# Patient Record
Sex: MALE | Race: WHITE | NOT HISPANIC OR LATINO | Employment: UNEMPLOYED | URBAN - METROPOLITAN AREA
[De-identification: names, ages, dates, MRNs, and addresses within clinical notes are randomized per-mention and may not be internally consistent; named-entity substitution may affect disease eponyms.]

---

## 2017-04-21 ENCOUNTER — HISTORICAL (OUTPATIENT)
Dept: RADIOLOGY | Facility: HOSPITAL | Age: 77
End: 2017-04-21

## 2017-05-05 ENCOUNTER — HISTORICAL (OUTPATIENT)
Dept: RADIOLOGY | Facility: HOSPITAL | Age: 77
End: 2017-05-05

## 2017-05-08 ENCOUNTER — HISTORICAL (OUTPATIENT)
Dept: RADIOLOGY | Facility: HOSPITAL | Age: 77
End: 2017-05-08

## 2017-07-18 ENCOUNTER — HISTORICAL (OUTPATIENT)
Dept: RADIOLOGY | Facility: HOSPITAL | Age: 77
End: 2017-07-18

## 2017-11-09 ENCOUNTER — HISTORICAL (OUTPATIENT)
Dept: RADIOLOGY | Facility: HOSPITAL | Age: 77
End: 2017-11-09

## 2018-01-25 ENCOUNTER — HISTORICAL (OUTPATIENT)
Dept: SURGERY | Facility: HOSPITAL | Age: 78
End: 2018-01-25

## 2018-01-25 LAB
ABS NEUT (OLG): 3 X10(3)/MCL (ref 1.5–6.9)
ALBUMIN SERPL-MCNC: 4.4 GM/DL (ref 3.4–5)
ALBUMIN/GLOB SERPL: 1.1 RATIO
ALP SERPL-CCNC: 74 UNIT/L (ref 30–113)
ALT SERPL-CCNC: 19 UNIT/L (ref 10–45)
APTT PPP: 24 SECOND(S) (ref 25–35)
AST SERPL-CCNC: 22 UNIT/L (ref 15–37)
BILIRUB SERPL-MCNC: 0.6 MG/DL (ref 0.1–0.9)
BILIRUBIN DIRECT+TOT PNL SERPL-MCNC: 0.1 MG/DL (ref 0–0.3)
BILIRUBIN DIRECT+TOT PNL SERPL-MCNC: 0.5 MG/DL
BUN SERPL-MCNC: 17 MG/DL (ref 10–20)
CALCIUM SERPL-MCNC: 9.3 MG/DL (ref 8–10.5)
CHLORIDE SERPL-SCNC: 101 MMOL/L (ref 100–108)
CO2 SERPL-SCNC: 29 MMOL/L (ref 21–35)
CREAT SERPL-MCNC: 1.21 MG/DL (ref 0.7–1.3)
ERYTHROCYTE [DISTWIDTH] IN BLOOD BY AUTOMATED COUNT: 13 % (ref 11.5–17)
GLOBULIN SER-MCNC: 3.9 GM/DL
GLUCOSE SERPL-MCNC: 112 MG/DL (ref 75–116)
HCT VFR BLD AUTO: 45.3 % (ref 42–52)
HGB BLD-MCNC: 15.2 GM/DL (ref 14–18)
INR PPP: 0.9 (ref 0–1.2)
MCH RBC QN AUTO: 32 PG (ref 27–34)
MCHC RBC AUTO-ENTMCNC: 34 GM/DL (ref 31–36)
MCV RBC AUTO: 97 FL (ref 80–99)
PLATELET # BLD AUTO: 210 X10(3)/MCL (ref 140–400)
PMV BLD AUTO: 10 FL (ref 6.8–10)
POTASSIUM SERPL-SCNC: 4.6 MMOL/L (ref 3.6–5.2)
PROT SERPL-MCNC: 8.3 GM/DL (ref 6.4–8.2)
PROTHROMBIN TIME: 10 SECOND(S) (ref 9–12)
RBC # BLD AUTO: 4.67 X10(6)/MCL (ref 4.7–6.1)
SODIUM SERPL-SCNC: 137 MMOL/L (ref 135–145)
WBC # SPEC AUTO: 5.2 X10(3)/MCL (ref 4.5–11.5)

## 2018-02-01 ENCOUNTER — HISTORICAL (OUTPATIENT)
Dept: ANESTHESIOLOGY | Facility: HOSPITAL | Age: 78
End: 2018-02-01

## 2018-03-13 ENCOUNTER — HISTORICAL (OUTPATIENT)
Dept: SURGERY | Facility: HOSPITAL | Age: 78
End: 2018-03-13

## 2018-03-13 LAB
APPEARANCE, UA: CLEAR
APTT PPP: 24.5 SECOND(S) (ref 25–35)
BACTERIA SPEC CULT: NORMAL /HPF
BILIRUB UR QL STRIP: NEGATIVE
COLOR UR: YELLOW
GLUCOSE (UA): NEGATIVE
HGB UR QL STRIP: NEGATIVE
INR PPP: 0.9 (ref 0–1.2)
KETONES UR QL STRIP: NEGATIVE
LEUKOCYTE ESTERASE UR QL STRIP: NEGATIVE
NITRITE UR QL STRIP: NEGATIVE
PH UR STRIP: 6 [PH]
PROT UR QL STRIP: 100 MG/DL
PROTHROMBIN TIME: 10 SECOND(S) (ref 9–12)
RBC #/AREA URNS HPF: NORMAL /HPF
SP GR UR STRIP: 1.02
SQUAMOUS EPITHELIAL, UA: NORMAL
UROBILINOGEN UR STRIP-ACNC: 0.2 EU/DL
WBC #/AREA URNS HPF: NORMAL /[HPF]

## 2018-03-20 ENCOUNTER — HISTORICAL (OUTPATIENT)
Dept: ANESTHESIOLOGY | Facility: HOSPITAL | Age: 78
End: 2018-03-20

## 2018-04-24 ENCOUNTER — HISTORICAL (OUTPATIENT)
Dept: RADIOLOGY | Facility: HOSPITAL | Age: 78
End: 2018-04-24

## 2018-04-24 LAB — POC CREATININE: 1.2 MG/DL (ref 0.6–1.3)

## 2018-08-29 ENCOUNTER — HISTORICAL (OUTPATIENT)
Dept: RADIOLOGY | Facility: HOSPITAL | Age: 78
End: 2018-08-29

## 2018-09-11 ENCOUNTER — HISTORICAL (OUTPATIENT)
Dept: RADIOLOGY | Facility: HOSPITAL | Age: 78
End: 2018-09-11

## 2020-07-10 ENCOUNTER — HISTORICAL (OUTPATIENT)
Dept: RADIOLOGY | Facility: HOSPITAL | Age: 80
End: 2020-07-10

## 2021-05-19 ENCOUNTER — HISTORICAL (OUTPATIENT)
Dept: ADMINISTRATIVE | Facility: HOSPITAL | Age: 81
End: 2021-05-19

## 2021-05-19 LAB — PSA SERPL-MCNC: 5.17 NG/ML

## 2022-04-04 ENCOUNTER — HISTORICAL (OUTPATIENT)
Dept: LAB | Facility: HOSPITAL | Age: 82
End: 2022-04-04

## 2022-04-04 LAB
ABS NEUT (OLG): 2.8 (ref 1.5–6.9)
ALBUMIN SERPL-MCNC: 4 G/DL (ref 3.4–4.8)
ALBUMIN/GLOB SERPL: 1.4 {RATIO} (ref 1.1–2)
ALP SERPL-CCNC: 62 U/L (ref 40–150)
ALT SERPL-CCNC: 8 U/L (ref 0–55)
AST SERPL-CCNC: 19 U/L (ref 5–34)
BASOPHILS # BLD AUTO: 0.1 10*3/UL (ref 0–0.1)
BASOPHILS NFR BLD AUTO: 1 % (ref 0–1)
BILIRUB SERPL-MCNC: 0.6 MG/DL
BILIRUBIN DIRECT+TOT PNL SERPL-MCNC: 0.2 (ref 0–0.5)
BILIRUBIN DIRECT+TOT PNL SERPL-MCNC: 0.4 (ref 0–0.8)
BUN SERPL-MCNC: 21 MG/DL (ref 8.4–25.7)
CALCIUM SERPL-MCNC: 9.8 MG/DL (ref 8.7–10.5)
CHLORIDE SERPL-SCNC: 107 MMOL/L (ref 98–107)
CHOLEST SERPL-MCNC: 257 MG/DL
CHOLEST/HDLC SERPL: 5 {RATIO} (ref 0–5)
CO2 SERPL-SCNC: 26 MMOL/L (ref 23–31)
CREAT SERPL-MCNC: 1.07 MG/DL (ref 0.73–1.18)
EOSINOPHIL # BLD AUTO: 0.3 10*3/UL (ref 0–0.6)
EOSINOPHIL NFR BLD AUTO: 5 % (ref 0–5)
ERYTHROCYTE [DISTWIDTH] IN BLOOD BY AUTOMATED COUNT: 13.2 % (ref 11.5–17)
GLOBULIN SER-MCNC: 2.9 G/DL (ref 2.4–3.5)
GLUCOSE SERPL-MCNC: 107 MG/DL (ref 82–115)
HCT VFR BLD AUTO: 42.4 % (ref 42–52)
HDLC SERPL-MCNC: 51 MG/DL (ref 35–60)
HEMOLYSIS INTERF INDEX SERPL-ACNC: 2
HGB BLD-MCNC: 13.8 G/DL (ref 14–18)
ICTERIC INTERF INDEX SERPL-ACNC: 0
IMM GRANULOCYTES # BLD AUTO: 0.01 10*3/UL (ref 0–0.02)
IMM GRANULOCYTES NFR BLD AUTO: 0.2 % (ref 0–0.43)
LDLC SERPL CALC-MCNC: 167 MG/DL (ref 50–140)
LIPEMIC INTERF INDEX SERPL-ACNC: 1
LYMPHOCYTES # BLD AUTO: 1.5 10*3/UL (ref 0.5–4.1)
LYMPHOCYTES NFR BLD AUTO: 29 % (ref 15–40)
MANUAL DIFF? (OHS): NO
MCH RBC QN AUTO: 32 PG (ref 27–34)
MCHC RBC AUTO-ENTMCNC: 32 G/DL (ref 31–36)
MCV RBC AUTO: 100 FL (ref 80–99)
MONOCYTES # BLD AUTO: 0.6 10*3/UL (ref 0–1.1)
MONOCYTES NFR BLD AUTO: 11 % (ref 4–12)
NEUTROPHILS # BLD AUTO: 2.8 10*3/UL (ref 1.5–6.9)
NEUTROPHILS NFR BLD AUTO: 54 % (ref 43–75)
PLATELET # BLD AUTO: 256 10*3/UL (ref 140–400)
PMV BLD AUTO: 9.8 FL (ref 6.8–10)
POTASSIUM SERPL-SCNC: 4.7 MMOL/L (ref 3.5–5.1)
PROT SERPL-MCNC: 6.9 G/DL (ref 5.8–7.6)
RBC # BLD AUTO: 4.25 10*6/UL (ref 4.7–6.1)
SODIUM SERPL-SCNC: 142 MMOL/L (ref 136–145)
TRIGL SERPL-MCNC: 195 MG/DL (ref 34–140)
VLDLC SERPL CALC-MCNC: 39 MG/DL
WBC # SPEC AUTO: 5.2 10*3/UL (ref 4.5–11.5)

## 2022-04-10 ENCOUNTER — HISTORICAL (OUTPATIENT)
Dept: ADMINISTRATIVE | Facility: HOSPITAL | Age: 82
End: 2022-04-10

## 2022-04-24 VITALS
OXYGEN SATURATION: 96 % | SYSTOLIC BLOOD PRESSURE: 135 MMHG | DIASTOLIC BLOOD PRESSURE: 76 MMHG | HEIGHT: 71 IN | WEIGHT: 201.06 LBS | BODY MASS INDEX: 28.15 KG/M2

## 2022-04-30 NOTE — OP NOTE
Patient:   Checo Webb             MRN: [id number]            FIN: [id number]               Age:   77 years     Sex:  Male     :  1940   Associated Diagnoses:   Ventral hernia   Author:   Natalee Santos MD      Operative Note   Operative Information   Date/ Time:  3/20/2018 13:10:00.     Procedures Performed: Procedure Code   Laparoscopy, surgical, repair, incisional hernia (includes mesh insertion, when performed); reducible (58487)..     Indications: 78-year-old white male developing incisional ventral hernia without incarceration however symptomatic with activity..     Preoperative Diagnosis: Ventral hernia ([id number]).     Postoperative Diagnosis: Ventral hernia ([id number] K43).     Surgeon: Natalee Santos MD.     Anesthesia: Gen..     Speciman Removed: preperitoneal fat and hernia sac.     Description of Procedure/Findings/    Complications: Procedure in detail:  Patient brought to the operating theater laid in the supine position general endotracheal intubation was performed a left bump was placed.  There the abdomen and chest were sterilely prepped and draped in normal surgical fashion.  Using Veress needle technique Veress needle was inserted palmers point in the left upper quadrant of the first pass and the abdomen was insufflated to 15 mmHg with CO2 gas.  The point a Visiport was placed into the right hypogastrium without difficulty.  The left abdominal wall was then examined and appropriate insertion point for robotic trochars were chosen.  The skin and subcutaneous tissues overlying each insertion point were infiltrated 1% lidocaine and epinephrine.  A #11 blade was used to incise the skin in the trochars were inserted under direct visualization into the left quadrant.  Two 8 mm operative trochars and one 8 mm optical trocar. then finally a right upper quadrant assistant port was placed.   The robot was then pulled over the patient's right side and the camera and instruments were  introduced into the abdomen under direct visualization.  At this point I took over control of the robotic tower leaving the assistance at the bedside.  Properly marked spots within the peritoneum were chosen and dissection was carried around the ventral wall defect. This proved to be of large size ventral wall defect along with a Swiss cheese fascial breakdown.  the large defect and multiple small hernias throughout the abdominal wall could not be appropriately reapproximated as anticipated.   The defect measured approximately 5 x 6 cm in size with multiple smaller hernia defects around the midline.  A large peritoneal flap was created about encompassing the entire defect.   A Bard composite mesh was then chosen with 11 cm diameter and sutured onto the abdominal wall covering the ventral wall defect and all smaller defects with appropriate overlay.   A large preperitoneal fat pad and hernia sac was then removed from the right quadrant incision The  trochars then removed under direct visualization and the abdomen was relieved of insufflation.  The trocar insertion sites were closed with  4-0 subcuticular Monocryl and a sterile dressing was placed upon the skin.  The patient was relieved anesthesia in a stable condition and transferred to the postanesthesia care unit.  .     Esimated blood loss: loss  5  cc.     Complications: None.

## 2022-04-30 NOTE — OP NOTE
PREOPERATIVE DIAGNOSIS:    1. History of colon polyps.  2. Status post right-sided hemicolectomy secondary to biopsy secondary to a large tubular adenoma, unresectable endoscopically.    POSTOPERATIVE DIAGNOSIS:    1. No signs of right-sided recurrence of polyps.  2. Pan diverticulosis.  3. Proximal rectal polyp, cold biopsy removal piecemeal.  4. Distal rectal polyp, cold biopsy removal piecemeal.    INDICATIONS:  A 77-year-old white male, history of large elongated 3-4 cm adenoma not amenable to endoscopic resection in November 2016.  Patient had subsequent right-sided hemicolectomy under Dr. Natalee Santos.  The patient is here today to follow up for repeat colonoscopy surveillance approximately 1 year postop.     He denies any other alarm symptoms.     He was consented for the procedure in my office.  The risks and benefits of the procedure were explained to him in detail.  He is willing to undergo those risks.    PROCEDURE IN DETAIL:  He was brought down to the endoscopy suite, laid in the left lateral decubitus position.  Rectal exam was performed.  No internal and no external abnormalities.  Prostate was mildly enlarged, otherwise normal.  No nodules palpated.     The scope was then lubricated and advanced all way to the right side of the colon and where the anastomotic site was seen.     I was able to clearly enter into the terminal ileum where there were no changes, masses, polyps or any mucosal changes.  Same applies to anastomotic site, which was viewed, photographed and washed and the proximal side of the transverse colon at the anastomotic site was also free of lesions.   Of note, just after this area, he started to have signs of diverticulosis throughout.  Primarily left-sided and more in the sigmoid but he had pandiverticulosis throughout the remaining part of his colon.  These all ranged from about 4-5 mm.  No masses in the transverse, splenic and descending colon and sigmoid.     However,  there was a small 4-5 mm polyp at the proximal rectal region that was removed cold biopsy piecemeal removal completely.  Bleeding complications none.  No perforation.  The same maneuver was performed with a rectal polyp distally, a 4 to 5 mm lesion, cold biopsy removal piecemeal.  Blood loss was negligible.     The scope was withdrawn.  Patient tolerated the procedure well.    SUMMARY:    1. A 77-year-old white male with no signs of recurrence of previous polyp around the anastomotic site.  2. Pandiverticulosis.  3. Proximal rectal polyp cold biopsy removal piecemeal.  4. Rectal polyp biopsy cold removal piecemeal.    PLAN:  We will follow up on these pathology of the biopsies aforementioned.  If these come back as hyperplastic, we will likely not recommend any further colonoscopies due to age.  Just watch him from the standpoint of diverticular disease but otherwise, again, we will wait on pathology to make specific recommendations.  Three years versus again secondary to age, then just follow him clinically.        LATIA   DD: 02/01/2018 1049   DT: 02/01/2018 1117  Job # [id number]    cc: Natalee Santos M.D.

## 2023-02-24 ENCOUNTER — LAB VISIT (OUTPATIENT)
Dept: LAB | Facility: HOSPITAL | Age: 83
End: 2023-02-24
Attending: INTERNAL MEDICINE
Payer: MEDICARE

## 2023-02-24 DIAGNOSIS — R42 DIZZINESS AND GIDDINESS: Primary | ICD-10-CM

## 2023-02-24 DIAGNOSIS — R82.998 OTHER ABNORMAL FINDINGS IN URINE: ICD-10-CM

## 2023-02-24 DIAGNOSIS — E55.9 VITAMIN D DEFICIENCY: ICD-10-CM

## 2023-02-24 LAB
ALBUMIN SERPL-MCNC: 4.1 G/DL (ref 3.4–4.8)
ALBUMIN/GLOB SERPL: 1.4 RATIO (ref 1.1–2)
ALP SERPL-CCNC: 64 UNIT/L (ref 40–150)
ALT SERPL-CCNC: <5 UNIT/L (ref 0–55)
APPEARANCE UR: CLEAR
AST SERPL-CCNC: 16 UNIT/L (ref 5–34)
BACTERIA #/AREA URNS AUTO: ABNORMAL /HPF
BASOPHILS # BLD AUTO: 0.04 X10(3)/MCL (ref 0–0.2)
BASOPHILS NFR BLD AUTO: 0.8 %
BILIRUB UR QL STRIP.AUTO: NEGATIVE MG/DL
BILIRUBIN DIRECT+TOT PNL SERPL-MCNC: 0.4 MG/DL
BUN SERPL-MCNC: 23 MG/DL (ref 8.4–25.7)
CALCIUM SERPL-MCNC: 9.4 MG/DL (ref 8.8–10)
CHLORIDE SERPL-SCNC: 109 MMOL/L (ref 98–107)
CO2 SERPL-SCNC: 25 MMOL/L (ref 23–31)
COLOR UR AUTO: YELLOW
CREAT SERPL-MCNC: 1.61 MG/DL (ref 0.73–1.18)
EOSINOPHIL # BLD AUTO: 0.14 X10(3)/MCL (ref 0–0.9)
EOSINOPHIL NFR BLD AUTO: 2.7 %
ERYTHROCYTE [DISTWIDTH] IN BLOOD BY AUTOMATED COUNT: 13.5 % (ref 11.5–17)
GFR SERPLBLD CREATININE-BSD FMLA CKD-EPI: 42 MLS/MIN/1.73/M2
GLOBULIN SER-MCNC: 2.9 GM/DL (ref 2.4–3.5)
GLUCOSE SERPL-MCNC: 124 MG/DL (ref 82–115)
GLUCOSE UR QL STRIP.AUTO: NEGATIVE MG/DL
HCT VFR BLD AUTO: 40.2 % (ref 42–52)
HGB BLD-MCNC: 13.5 G/DL (ref 14–18)
IMM GRANULOCYTES # BLD AUTO: 0.01 X10(3)/MCL (ref 0–0.04)
IMM GRANULOCYTES NFR BLD AUTO: 0.2 %
KETONES UR QL STRIP.AUTO: NEGATIVE MG/DL
LEUKOCYTE ESTERASE UR QL STRIP.AUTO: NEGATIVE UNIT/L
LYMPHOCYTES # BLD AUTO: 1.23 X10(3)/MCL (ref 0.6–4.6)
LYMPHOCYTES NFR BLD AUTO: 23.7 %
MCH RBC QN AUTO: 32.6 PG
MCHC RBC AUTO-ENTMCNC: 33.6 G/DL (ref 33–36)
MCV RBC AUTO: 97.1 FL (ref 80–94)
MONOCYTES # BLD AUTO: 0.47 X10(3)/MCL (ref 0.1–1.3)
MONOCYTES NFR BLD AUTO: 9 %
MUCOUS THREADS URNS QL MICRO: ABNORMAL /LPF
NEUTROPHILS # BLD AUTO: 3.31 X10(3)/MCL (ref 2.1–9.2)
NEUTROPHILS NFR BLD AUTO: 63.6 %
NITRITE UR QL STRIP.AUTO: NEGATIVE
PH UR STRIP.AUTO: 5 [PH]
PLATELET # BLD AUTO: 201 X10(3)/MCL (ref 130–400)
PMV BLD AUTO: 9.9 FL (ref 7.4–10.4)
POTASSIUM SERPL-SCNC: 4.8 MMOL/L (ref 3.5–5.1)
PROT SERPL-MCNC: 7 GM/DL (ref 5.8–7.6)
PROT UR QL STRIP.AUTO: >=300 MG/DL
RBC # BLD AUTO: 4.14 X10(6)/MCL (ref 4.7–6.1)
RBC #/AREA URNS AUTO: ABNORMAL /HPF
RBC UR QL AUTO: NEGATIVE UNIT/L
SODIUM SERPL-SCNC: 142 MMOL/L (ref 136–145)
SP GR UR STRIP.AUTO: >=1.03
SQUAMOUS #/AREA URNS AUTO: ABNORMAL /HPF
UROBILINOGEN UR STRIP-ACNC: 0.2 MG/DL
WBC # SPEC AUTO: 5.2 X10(3)/MCL (ref 4.5–11.5)
WBC #/AREA URNS AUTO: ABNORMAL /HPF

## 2023-02-24 PROCEDURE — 82607 VITAMIN B-12: CPT

## 2023-02-24 PROCEDURE — 36415 COLL VENOUS BLD VENIPUNCTURE: CPT

## 2023-02-24 PROCEDURE — 82306 VITAMIN D 25 HYDROXY: CPT

## 2023-02-24 PROCEDURE — 85025 COMPLETE CBC W/AUTO DIFF WBC: CPT

## 2023-02-24 PROCEDURE — 87088 URINE BACTERIA CULTURE: CPT

## 2023-02-24 PROCEDURE — 81001 URINALYSIS AUTO W/SCOPE: CPT

## 2023-02-24 PROCEDURE — 80053 COMPREHEN METABOLIC PANEL: CPT

## 2023-02-25 LAB
DEPRECATED CALCIDIOL+CALCIFEROL SERPL-MC: 72.8 NG/ML (ref 30–80)
VIT B12 SERPL-MCNC: 523 PG/ML (ref 213–816)

## 2023-02-27 LAB — BACTERIA UR CULT: NO GROWTH

## 2023-07-03 ENCOUNTER — LAB VISIT (OUTPATIENT)
Dept: LAB | Facility: HOSPITAL | Age: 83
End: 2023-07-03
Attending: INTERNAL MEDICINE
Payer: MEDICARE

## 2023-07-03 DIAGNOSIS — M94.9 DISORDER OF CARTILAGE, UNSPECIFIED: ICD-10-CM

## 2023-07-03 DIAGNOSIS — M62.81 MUSCLE WEAKNESS (GENERALIZED): Primary | ICD-10-CM

## 2023-07-03 LAB
ALBUMIN SERPL-MCNC: 3.8 G/DL (ref 3.4–4.8)
ALBUMIN/GLOB SERPL: 1.1 RATIO (ref 1.1–2)
ALP SERPL-CCNC: 74 UNIT/L (ref 40–150)
ALT SERPL-CCNC: 7 UNIT/L (ref 0–55)
AST SERPL-CCNC: 24 UNIT/L (ref 5–34)
BASOPHILS # BLD AUTO: 0.05 X10(3)/MCL
BASOPHILS NFR BLD AUTO: 1.1 %
BILIRUBIN DIRECT+TOT PNL SERPL-MCNC: 0.6 MG/DL
BUN SERPL-MCNC: 25 MG/DL (ref 8.4–25.7)
CALCIUM SERPL-MCNC: 9.9 MG/DL (ref 8.8–10)
CHLORIDE SERPL-SCNC: 107 MMOL/L (ref 98–107)
CK SERPL-CCNC: 63 U/L (ref 30–200)
CO2 SERPL-SCNC: 25 MMOL/L (ref 23–31)
CREAT SERPL-MCNC: 1.63 MG/DL (ref 0.73–1.18)
EOSINOPHIL # BLD AUTO: 0.33 X10(3)/MCL (ref 0–0.9)
EOSINOPHIL NFR BLD AUTO: 7 %
ERYTHROCYTE [DISTWIDTH] IN BLOOD BY AUTOMATED COUNT: 12.9 % (ref 11.5–17)
GFR SERPLBLD CREATININE-BSD FMLA CKD-EPI: 41 MLS/MIN/1.73/M2
GLOBULIN SER-MCNC: 3.4 GM/DL (ref 2.4–3.5)
GLUCOSE SERPL-MCNC: 99 MG/DL (ref 82–115)
HCT VFR BLD AUTO: 43.1 % (ref 42–52)
HGB BLD-MCNC: 14 G/DL (ref 14–18)
IMM GRANULOCYTES # BLD AUTO: 0.02 X10(3)/MCL (ref 0–0.04)
IMM GRANULOCYTES NFR BLD AUTO: 0.4 %
LYMPHOCYTES # BLD AUTO: 1.03 X10(3)/MCL (ref 0.6–4.6)
LYMPHOCYTES NFR BLD AUTO: 21.8 %
MAGNESIUM SERPL-MCNC: 2.3 MG/DL (ref 1.6–2.6)
MCH RBC QN AUTO: 31.1 PG (ref 27–31)
MCHC RBC AUTO-ENTMCNC: 32.5 G/DL (ref 33–36)
MCV RBC AUTO: 95.8 FL (ref 80–94)
MONOCYTES # BLD AUTO: 0.44 X10(3)/MCL (ref 0.1–1.3)
MONOCYTES NFR BLD AUTO: 9.3 %
NEUTROPHILS # BLD AUTO: 2.85 X10(3)/MCL (ref 2.1–9.2)
NEUTROPHILS NFR BLD AUTO: 60.4 %
PLATELET # BLD AUTO: 271 X10(3)/MCL (ref 130–400)
PMV BLD AUTO: 9.8 FL (ref 7.4–10.4)
POTASSIUM SERPL-SCNC: 4.3 MMOL/L (ref 3.5–5.1)
PREALB SERPL-MCNC: 34.5 MG/DL (ref 16–42)
PROT SERPL-MCNC: 7.2 GM/DL (ref 5.8–7.6)
RBC # BLD AUTO: 4.5 X10(6)/MCL (ref 4.7–6.1)
SODIUM SERPL-SCNC: 142 MMOL/L (ref 136–145)
TSH SERPL-ACNC: 1.04 UIU/ML (ref 0.35–4.94)
VIT B12 SERPL-MCNC: 482 PG/ML (ref 213–816)
WBC # SPEC AUTO: 4.72 X10(3)/MCL (ref 4.5–11.5)

## 2023-07-03 PROCEDURE — 84443 ASSAY THYROID STIM HORMONE: CPT

## 2023-07-03 PROCEDURE — 82550 ASSAY OF CK (CPK): CPT

## 2023-07-03 PROCEDURE — 36415 COLL VENOUS BLD VENIPUNCTURE: CPT

## 2023-07-03 PROCEDURE — 84134 ASSAY OF PREALBUMIN: CPT

## 2023-07-03 PROCEDURE — 85025 COMPLETE CBC W/AUTO DIFF WBC: CPT

## 2023-07-03 PROCEDURE — 80053 COMPREHEN METABOLIC PANEL: CPT

## 2023-07-03 PROCEDURE — 82652 VIT D 1 25-DIHYDROXY: CPT

## 2023-07-03 PROCEDURE — 82607 VITAMIN B-12: CPT

## 2023-07-03 PROCEDURE — 83735 ASSAY OF MAGNESIUM: CPT

## 2023-07-09 LAB — 1,25(OH)2D SERPL-MCNC: 34 PG/ML (ref 18–64)

## 2024-02-23 ENCOUNTER — LAB VISIT (OUTPATIENT)
Dept: LAB | Facility: HOSPITAL | Age: 84
End: 2024-02-23
Attending: INTERNAL MEDICINE
Payer: MEDICARE

## 2024-02-23 DIAGNOSIS — Z13.6 ENCOUNTER FOR SCREENING FOR CARDIOVASCULAR DISORDERS: ICD-10-CM

## 2024-02-23 DIAGNOSIS — R82.6 ABNORMAL URINE LEVELS OF SUBSTANCES CHIEFLY NONMEDICINAL AS TO SOURCE: ICD-10-CM

## 2024-02-23 DIAGNOSIS — F02.80 ALZHEIMER'S DISEASE: Primary | ICD-10-CM

## 2024-02-23 DIAGNOSIS — G30.9 ALZHEIMER'S DISEASE: Primary | ICD-10-CM

## 2024-02-23 DIAGNOSIS — R79.9 ABNORMAL FINDING OF BLOOD CHEMISTRY, UNSPECIFIED: ICD-10-CM

## 2024-02-23 DIAGNOSIS — R94.5 ABNORMAL RESULTS OF LIVER FUNCTION STUDIES: ICD-10-CM

## 2024-02-23 LAB
(HCYS)2 SERPL-MCNC: 17.6 UMOL/L (ref 5.1–15.4)
ALBUMIN SERPL-MCNC: 4.1 G/DL (ref 3.4–4.8)
ALBUMIN/GLOB SERPL: 1.1 RATIO (ref 1.1–2)
ALP SERPL-CCNC: 88 UNIT/L (ref 40–150)
ALT SERPL-CCNC: 6 UNIT/L (ref 0–55)
APPEARANCE UR: CLEAR
AST SERPL-CCNC: 14 UNIT/L (ref 5–34)
BACTERIA #/AREA URNS AUTO: ABNORMAL /HPF
BASOPHILS # BLD AUTO: 0.04 X10(3)/MCL
BASOPHILS NFR BLD AUTO: 0.8 %
BILIRUB SERPL-MCNC: 0.3 MG/DL
BILIRUB UR QL STRIP.AUTO: NEGATIVE
BUN SERPL-MCNC: 26 MG/DL (ref 8.4–25.7)
CALCIUM SERPL-MCNC: 10.4 MG/DL (ref 8.8–10)
CHLORIDE SERPL-SCNC: 112 MMOL/L (ref 98–107)
CO2 SERPL-SCNC: 24 MMOL/L (ref 23–31)
COLOR UR AUTO: YELLOW
CREAT SERPL-MCNC: 1.9 MG/DL (ref 0.73–1.18)
EOSINOPHIL # BLD AUTO: 0.24 X10(3)/MCL (ref 0–0.9)
EOSINOPHIL NFR BLD AUTO: 4.7 %
ERYTHROCYTE [DISTWIDTH] IN BLOOD BY AUTOMATED COUNT: 13.6 % (ref 11.5–17)
FERRITIN SERPL-MCNC: 144.3 NG/ML (ref 21.81–274.66)
FOLATE SERPL-MCNC: 8.1 NG/ML (ref 7–31.4)
GFR SERPLBLD CREATININE-BSD FMLA CKD-EPI: 34 MLS/MIN/1.73/M2
GLOBULIN SER-MCNC: 3.6 GM/DL (ref 2.4–3.5)
GLUCOSE SERPL-MCNC: 106 MG/DL (ref 82–115)
GLUCOSE UR QL STRIP.AUTO: NEGATIVE
HAV IGM SERPL QL IA: NONREACTIVE
HBV CORE IGM SERPL QL IA: NONREACTIVE
HBV SURFACE AG SERPL QL IA: NONREACTIVE
HCT VFR BLD AUTO: 42.2 % (ref 42–52)
HCV AB SERPL QL IA: NONREACTIVE
HGB BLD-MCNC: 14 G/DL (ref 14–18)
IMM GRANULOCYTES # BLD AUTO: 0.01 X10(3)/MCL (ref 0–0.04)
IMM GRANULOCYTES NFR BLD AUTO: 0.2 %
KETONES UR QL STRIP.AUTO: NEGATIVE
LEUKOCYTE ESTERASE UR QL STRIP.AUTO: NEGATIVE
LYMPHOCYTES # BLD AUTO: 0.78 X10(3)/MCL (ref 0.6–4.6)
LYMPHOCYTES NFR BLD AUTO: 15.3 %
MCH RBC QN AUTO: 32 PG (ref 27–31)
MCHC RBC AUTO-ENTMCNC: 33.2 G/DL (ref 33–36)
MCV RBC AUTO: 96.3 FL (ref 80–94)
MONOCYTES # BLD AUTO: 0.35 X10(3)/MCL (ref 0.1–1.3)
MONOCYTES NFR BLD AUTO: 6.9 %
MUCOUS THREADS URNS QL MICRO: ABNORMAL /LPF
NEUTROPHILS # BLD AUTO: 3.67 X10(3)/MCL (ref 2.1–9.2)
NEUTROPHILS NFR BLD AUTO: 72.1 %
NITRITE UR QL STRIP.AUTO: NEGATIVE
PH UR STRIP.AUTO: 5.5 [PH]
PLATELET # BLD AUTO: 259 X10(3)/MCL (ref 130–400)
PMV BLD AUTO: 10 FL (ref 7.4–10.4)
POTASSIUM SERPL-SCNC: 4 MMOL/L (ref 3.5–5.1)
PROT SERPL-MCNC: 7.7 GM/DL (ref 5.8–7.6)
PROT UR QL STRIP.AUTO: ABNORMAL
RBC # BLD AUTO: 4.38 X10(6)/MCL (ref 4.7–6.1)
RBC #/AREA URNS AUTO: ABNORMAL /HPF
RBC UR QL AUTO: ABNORMAL
SODIUM SERPL-SCNC: 145 MMOL/L (ref 136–145)
SP GR UR STRIP.AUTO: 1.02 (ref 1–1.03)
SQUAMOUS #/AREA URNS AUTO: ABNORMAL /HPF
T PALLIDUM AB SER QL: NONREACTIVE
TSH SERPL-ACNC: 1.38 UIU/ML (ref 0.35–4.94)
UROBILINOGEN UR STRIP-ACNC: 0.2
VIT B12 SERPL-MCNC: 421 PG/ML (ref 213–816)
WBC # SPEC AUTO: 5.09 X10(3)/MCL (ref 4.5–11.5)
WBC #/AREA URNS AUTO: ABNORMAL /HPF

## 2024-02-23 PROCEDURE — 84443 ASSAY THYROID STIM HORMONE: CPT

## 2024-02-23 PROCEDURE — 83090 ASSAY OF HOMOCYSTEINE: CPT

## 2024-02-23 PROCEDURE — 87086 URINE CULTURE/COLONY COUNT: CPT

## 2024-02-23 PROCEDURE — 81001 URINALYSIS AUTO W/SCOPE: CPT

## 2024-02-23 PROCEDURE — 82390 ASSAY OF CERULOPLASMIN: CPT

## 2024-02-23 PROCEDURE — 82570 ASSAY OF URINE CREATININE: CPT

## 2024-02-23 PROCEDURE — 80053 COMPREHEN METABOLIC PANEL: CPT

## 2024-02-23 PROCEDURE — 82746 ASSAY OF FOLIC ACID SERUM: CPT

## 2024-02-23 PROCEDURE — 82525 ASSAY OF COPPER: CPT

## 2024-02-23 PROCEDURE — 86617 LYME DISEASE ANTIBODY: CPT

## 2024-02-23 PROCEDURE — 85025 COMPLETE CBC W/AUTO DIFF WBC: CPT

## 2024-02-23 PROCEDURE — 83825 ASSAY OF MERCURY: CPT

## 2024-02-23 PROCEDURE — 82835: CPT

## 2024-02-23 PROCEDURE — 36415 COLL VENOUS BLD VENIPUNCTURE: CPT

## 2024-02-23 PROCEDURE — 84425 ASSAY OF VITAMIN B-1: CPT

## 2024-02-23 PROCEDURE — 82728 ASSAY OF FERRITIN: CPT

## 2024-02-23 PROCEDURE — 80074 ACUTE HEPATITIS PANEL: CPT

## 2024-02-23 PROCEDURE — 82175 ASSAY OF ARSENIC: CPT

## 2024-02-23 PROCEDURE — 82607 VITAMIN B-12: CPT

## 2024-02-23 PROCEDURE — 82300 ASSAY OF CADMIUM: CPT

## 2024-02-23 PROCEDURE — 83655 ASSAY OF LEAD: CPT

## 2024-02-23 PROCEDURE — 86780 TREPONEMA PALLIDUM: CPT

## 2024-02-24 LAB
COPPER SERPL-MCNC: 115 MCG/DL (ref 73–129)
MAYO GENERIC ORDERABLE RESULT: NORMAL

## 2024-02-25 LAB
B BURGDOR AB SER QL IA: NEGATIVE
BACTERIA UR CULT: NO GROWTH
CERULOPLASMIN SERPL-MCNC: 30 MG/DL (ref 19–31)

## 2024-02-27 LAB
PATH REV: NORMAL
VIT B1 BLD-SCNC: 152 NMOL/L (ref 70–180)

## 2024-04-26 ENCOUNTER — LAB VISIT (OUTPATIENT)
Dept: LAB | Facility: HOSPITAL | Age: 84
End: 2024-04-26
Attending: INTERNAL MEDICINE
Payer: MEDICARE

## 2024-04-26 DIAGNOSIS — I11.9 MALIGNANT HYPERTENSIVE HEART DISEASE WITHOUT HEART FAILURE: Primary | ICD-10-CM

## 2024-04-26 DIAGNOSIS — E78.2 MIXED HYPERLIPIDEMIA: ICD-10-CM

## 2024-04-26 DIAGNOSIS — Z12.5 SPECIAL SCREENING FOR MALIGNANT NEOPLASM OF PROSTATE: ICD-10-CM

## 2024-04-26 LAB
ALBUMIN SERPL-MCNC: 3.9 G/DL (ref 3.4–4.8)
ALBUMIN/GLOB SERPL: 1.3 RATIO (ref 1.1–2)
ALP SERPL-CCNC: 70 UNIT/L (ref 40–150)
ALT SERPL-CCNC: 9 UNIT/L (ref 0–55)
AST SERPL-CCNC: 27 UNIT/L (ref 5–34)
BASOPHILS # BLD AUTO: 0.06 X10(3)/MCL
BASOPHILS NFR BLD AUTO: 0.9 %
BILIRUB SERPL-MCNC: 0.7 MG/DL
BUN SERPL-MCNC: 45 MG/DL (ref 8.4–25.7)
CALCIUM SERPL-MCNC: 9.3 MG/DL (ref 8.8–10)
CHLORIDE SERPL-SCNC: 110 MMOL/L (ref 98–107)
CHOLEST SERPL-MCNC: 231 MG/DL
CHOLEST/HDLC SERPL: 5 {RATIO} (ref 0–5)
CO2 SERPL-SCNC: 20 MMOL/L (ref 23–31)
CREAT SERPL-MCNC: 2.3 MG/DL (ref 0.73–1.18)
EOSINOPHIL # BLD AUTO: 0.27 X10(3)/MCL (ref 0–0.9)
EOSINOPHIL NFR BLD AUTO: 4.1 %
ERYTHROCYTE [DISTWIDTH] IN BLOOD BY AUTOMATED COUNT: 13.9 % (ref 11.5–17)
GFR SERPLBLD CREATININE-BSD FMLA CKD-EPI: 27 MLS/MIN/1.73/M2
GLOBULIN SER-MCNC: 3 GM/DL (ref 2.4–3.5)
GLUCOSE SERPL-MCNC: 99 MG/DL (ref 82–115)
HCT VFR BLD AUTO: 38.1 % (ref 42–52)
HDLC SERPL-MCNC: 49 MG/DL (ref 35–60)
HGB BLD-MCNC: 12.4 G/DL (ref 14–18)
IMM GRANULOCYTES # BLD AUTO: 0.01 X10(3)/MCL (ref 0–0.04)
IMM GRANULOCYTES NFR BLD AUTO: 0.2 %
LDLC SERPL CALC-MCNC: 152 MG/DL (ref 50–140)
LYMPHOCYTES # BLD AUTO: 1.1 X10(3)/MCL (ref 0.6–4.6)
LYMPHOCYTES NFR BLD AUTO: 16.9 %
MCH RBC QN AUTO: 31.8 PG (ref 27–31)
MCHC RBC AUTO-ENTMCNC: 32.5 G/DL (ref 33–36)
MCV RBC AUTO: 97.7 FL (ref 80–94)
MONOCYTES # BLD AUTO: 0.51 X10(3)/MCL (ref 0.1–1.3)
MONOCYTES NFR BLD AUTO: 7.8 %
NEUTROPHILS # BLD AUTO: 4.56 X10(3)/MCL (ref 2.1–9.2)
NEUTROPHILS NFR BLD AUTO: 70.1 %
PLATELET # BLD AUTO: 219 X10(3)/MCL (ref 130–400)
PMV BLD AUTO: 10.1 FL (ref 7.4–10.4)
POTASSIUM SERPL-SCNC: 4 MMOL/L (ref 3.5–5.1)
PROT SERPL-MCNC: 6.9 GM/DL (ref 5.8–7.6)
PSA SERPL-MCNC: 5.96 NG/ML
RBC # BLD AUTO: 3.9 X10(6)/MCL (ref 4.7–6.1)
SODIUM SERPL-SCNC: 139 MMOL/L (ref 136–145)
TRIGL SERPL-MCNC: 152 MG/DL (ref 34–140)
VLDLC SERPL CALC-MCNC: 30 MG/DL
WBC # SPEC AUTO: 6.51 X10(3)/MCL (ref 4.5–11.5)

## 2024-04-26 PROCEDURE — 80053 COMPREHEN METABOLIC PANEL: CPT

## 2024-04-26 PROCEDURE — 80061 LIPID PANEL: CPT

## 2024-04-26 PROCEDURE — 84153 ASSAY OF PSA TOTAL: CPT

## 2024-04-26 PROCEDURE — 36415 COLL VENOUS BLD VENIPUNCTURE: CPT

## 2024-04-26 PROCEDURE — 85025 COMPLETE CBC W/AUTO DIFF WBC: CPT

## 2024-04-27 ENCOUNTER — HOSPITAL ENCOUNTER (EMERGENCY)
Facility: HOSPITAL | Age: 84
Discharge: HOME | End: 2024-04-27
Attending: EMERGENCY MEDICINE
Payer: MEDICARE

## 2024-04-27 VITALS
OXYGEN SATURATION: 99 % | TEMPERATURE: 98 F | BODY MASS INDEX: 24.68 KG/M2 | RESPIRATION RATE: 16 BRPM | HEART RATE: 84 BPM | HEIGHT: 72 IN | WEIGHT: 182.19 LBS | SYSTOLIC BLOOD PRESSURE: 171 MMHG | DIASTOLIC BLOOD PRESSURE: 93 MMHG

## 2024-04-27 DIAGNOSIS — R33.8 ACUTE URINARY RETENTION: Primary | ICD-10-CM

## 2024-04-27 LAB
APPEARANCE UR: CLEAR
BACTERIA #/AREA URNS AUTO: ABNORMAL /HPF
BILIRUB UR QL STRIP.AUTO: NEGATIVE
COLOR UR AUTO: YELLOW
GLUCOSE UR QL STRIP.AUTO: NEGATIVE
KETONES UR QL STRIP.AUTO: ABNORMAL
LEUKOCYTE ESTERASE UR QL STRIP.AUTO: NEGATIVE
NITRITE UR QL STRIP.AUTO: NEGATIVE
PH UR STRIP.AUTO: 5.5 [PH]
PROT UR QL STRIP.AUTO: ABNORMAL
RBC #/AREA URNS AUTO: ABNORMAL /HPF
RBC UR QL AUTO: NEGATIVE
SP GR UR STRIP.AUTO: 1.01 (ref 1–1.03)
SQUAMOUS #/AREA URNS AUTO: ABNORMAL /HPF
UROBILINOGEN UR STRIP-ACNC: 0.2
WBC #/AREA URNS AUTO: ABNORMAL /HPF

## 2024-04-27 PROCEDURE — 81001 URINALYSIS AUTO W/SCOPE: CPT | Performed by: EMERGENCY MEDICINE

## 2024-04-27 PROCEDURE — 99283 EMERGENCY DEPT VISIT LOW MDM: CPT

## 2024-04-27 RX ORDER — TAMSULOSIN HYDROCHLORIDE 0.4 MG/1
0.4 CAPSULE ORAL DAILY
Qty: 14 CAPSULE | Refills: 0 | Status: SHIPPED | OUTPATIENT
Start: 2024-04-27 | End: 2024-05-11

## 2024-04-27 NOTE — ED PROVIDER NOTES
Encounter Date: 4/27/2024       History     Chief Complaint   Patient presents with    Urinary Retention     Pt reports urinary retention, having some dribbling. Pt states has some abdominal pressure.      The history is provided by the patient and a relative.   Male  Problem  Primary symptoms comment: urinary retention. This is a recurrent problem. The current episode started yesterday. The problem has been unchanged. Associated symptoms include abdominal pain and abdominal swelling. Pertinent negatives include no nausea.   Patient has a history of what sounds like bladder stones that have caused retention in the past (last episode about 8 years ago).  Notes some leakage when straining/rising from seated position.  Mild suprapubic discomfort.      Review of patient's allergies indicates:  No Known Allergies  No past medical history on file.  No past surgical history on file.  No family history on file.  Social History     Tobacco Use    Smoking status: Never    Smokeless tobacco: Current     Types: Chew   Substance Use Topics    Alcohol use: Yes     Comment: social    Drug use: Never     Review of Systems   Constitutional:  Negative for fever.   HENT:  Negative for sore throat.    Respiratory:  Negative for shortness of breath.    Cardiovascular:  Negative for chest pain.   Gastrointestinal:  Positive for abdominal pain. Negative for nausea.   Musculoskeletal:  Negative for back pain.   Skin:  Negative for rash.   Neurological:  Negative for weakness.   Hematological:  Does not bruise/bleed easily.       Physical Exam     Initial Vitals [04/27/24 1609]   BP Pulse Resp Temp SpO2   (!) 218/130 91 18 -- 96 %      MAP       --         Physical Exam    Nursing note and vitals reviewed.  Constitutional: He appears well-developed and well-nourished.   HENT:   Head: Normocephalic and atraumatic.   Right Ear: External ear normal.   Left Ear: External ear normal.   Nose: Nose normal.   Eyes: Conjunctivae and EOM are  normal. Pupils are equal, round, and reactive to light.   Neck: Neck supple.   Normal range of motion.  Cardiovascular:  Normal rate, regular rhythm, normal heart sounds and intact distal pulses.           Pulmonary/Chest: Breath sounds normal.   Abdominal: Abdomen is soft. Bowel sounds are normal.   Musculoskeletal:         General: Normal range of motion.      Cervical back: Normal range of motion and neck supple.     Neurological: He is alert and oriented to person, place, and time. He has normal strength. GCS score is 15. GCS eye subscore is 4. GCS verbal subscore is 5. GCS motor subscore is 6.   Skin: Skin is warm and dry. Capillary refill takes less than 2 seconds.   Psychiatric: He has a normal mood and affect. His behavior is normal. Judgment and thought content normal.         ED Course   Procedures  Labs Reviewed   URINALYSIS, REFLEX TO URINE CULTURE - Abnormal; Notable for the following components:       Result Value    Protein, UA 1+ (*)     Ketones, UA Trace (*)     All other components within normal limits   URINALYSIS, MICROSCOPIC - Abnormal; Notable for the following components:    Squamous Epithelial Cells, UA Few (*)     All other components within normal limits          Imaging Results    None          Medications - No data to display  Medical Decision Making  Amount and/or Complexity of Data Reviewed  Labs: ordered. Decision-making details documented in ED Course.    Risk  Prescription drug management.       Differential includes:  BPH, bladder stones, malignancy, other more rare causes of bladder outlet obstruction.  Denies any new medications (OTC or prescription).  Bladder scan in ED reveals > 999 mL so will have RN place Talavera and send for UA.  Will place leg bag and D/C home and have him call his urologist's office on Monday (Dr. Blakely).                               Clinical Impression:  Final diagnoses:  [R33.8] Acute urinary retention (Primary)          ED Disposition Condition     Discharge Stable          ED Prescriptions       Medication Sig Dispense Start Date End Date Auth. Provider    tamsulosin (FLOMAX) 0.4 mg Cap Take 1 capsule (0.4 mg total) by mouth once daily. for 14 days 14 capsule 4/27/2024 5/11/2024 Kirill King MD          Follow-up Information       Follow up With Specialties Details Why Contact Info    Norman Blakely MD Urology Schedule an appointment as soon as possible for a visit   [address] [place] [id number]  [phone]               Kirill King MD  04/27/24 2690

## 2024-05-03 ENCOUNTER — HOSPITAL ENCOUNTER (OUTPATIENT)
Dept: RADIOLOGY | Facility: HOSPITAL | Age: 84
Discharge: HOME | End: 2024-05-03
Attending: INTERNAL MEDICINE
Payer: MEDICARE

## 2024-05-03 DIAGNOSIS — I11.9 HYPERTENSIVE CARDIOVASCULAR DISEASE: ICD-10-CM

## 2024-05-03 PROCEDURE — 71046 X-RAY EXAM CHEST 2 VIEWS: CPT | Mod: TC

## 2024-05-13 DIAGNOSIS — N13.8 ENLARGED PROSTATE WITH URINARY OBSTRUCTION: ICD-10-CM

## 2024-05-13 DIAGNOSIS — R33.9 RETENTION OF URINE, UNSPECIFIED: Primary | ICD-10-CM

## 2024-05-13 DIAGNOSIS — N40.1 ENLARGED PROSTATE WITH URINARY OBSTRUCTION: ICD-10-CM

## 2024-05-17 ENCOUNTER — HOSPITAL ENCOUNTER (EMERGENCY)
Facility: HOSPITAL | Age: 84
Discharge: HOME | End: 2024-05-18
Attending: EMERGENCY MEDICINE
Payer: MEDICARE

## 2024-05-17 DIAGNOSIS — T83.511A URINARY TRACT INFECTION ASSOCIATED WITH INDWELLING URETHRAL CATHETER, INITIAL ENCOUNTER: ICD-10-CM

## 2024-05-17 DIAGNOSIS — R33.9 URINARY RETENTION: Primary | ICD-10-CM

## 2024-05-17 DIAGNOSIS — N39.0 URINARY TRACT INFECTION ASSOCIATED WITH INDWELLING URETHRAL CATHETER, INITIAL ENCOUNTER: ICD-10-CM

## 2024-05-17 LAB
ANION GAP SERPL CALC-SCNC: 9 MEQ/L
BACTERIA #/AREA URNS AUTO: ABNORMAL /HPF
BASOPHILS # BLD AUTO: 0.04 X10(3)/MCL
BASOPHILS NFR BLD AUTO: 0.5 %
BILIRUB UR QL STRIP.AUTO: NEGATIVE
BUN SERPL-MCNC: 24 MG/DL (ref 8.4–25.7)
CALCIUM SERPL-MCNC: 9.1 MG/DL (ref 8.8–10)
CHLORIDE SERPL-SCNC: 107 MMOL/L (ref 98–107)
CLARITY UR: ABNORMAL
CO2 SERPL-SCNC: 24 MMOL/L (ref 23–31)
COLOR UR AUTO: YELLOW
CREAT SERPL-MCNC: 1.47 MG/DL (ref 0.73–1.18)
CREAT/UREA NIT SERPL: 16
EOSINOPHIL # BLD AUTO: 0.18 X10(3)/MCL (ref 0–0.9)
EOSINOPHIL NFR BLD AUTO: 2.3 %
ERYTHROCYTE [DISTWIDTH] IN BLOOD BY AUTOMATED COUNT: 12.9 % (ref 11.5–17)
GFR SERPLBLD CREATININE-BSD FMLA CKD-EPI: 46 ML/MIN/1.73/M2
GLUCOSE SERPL-MCNC: 100 MG/DL (ref 82–115)
GLUCOSE UR QL STRIP: NEGATIVE
HCT VFR BLD AUTO: 39.1 % (ref 42–52)
HGB BLD-MCNC: 13 G/DL (ref 14–18)
HGB UR QL STRIP: ABNORMAL
IMM GRANULOCYTES # BLD AUTO: 0.02 X10(3)/MCL (ref 0–0.04)
IMM GRANULOCYTES NFR BLD AUTO: 0.3 %
KETONES UR QL STRIP: NEGATIVE
LEUKOCYTE ESTERASE UR QL STRIP: ABNORMAL
LYMPHOCYTES # BLD AUTO: 1.25 X10(3)/MCL (ref 0.6–4.6)
LYMPHOCYTES NFR BLD AUTO: 15.8 %
MCH RBC QN AUTO: 31.7 PG (ref 27–31)
MCHC RBC AUTO-ENTMCNC: 33.2 G/DL (ref 33–36)
MCV RBC AUTO: 95.4 FL (ref 80–94)
MONOCYTES # BLD AUTO: 0.69 X10(3)/MCL (ref 0.1–1.3)
MONOCYTES NFR BLD AUTO: 8.7 %
NEUTROPHILS # BLD AUTO: 5.71 X10(3)/MCL (ref 2.1–9.2)
NEUTROPHILS NFR BLD AUTO: 72.4 %
NITRITE UR QL STRIP: POSITIVE
PH UR STRIP: >=9 [PH]
PLATELET # BLD AUTO: 263 X10(3)/MCL (ref 130–400)
PMV BLD AUTO: 9.8 FL (ref 7.4–10.4)
POTASSIUM SERPL-SCNC: 3.9 MMOL/L (ref 3.5–5.1)
PROT UR QL STRIP: ABNORMAL
RBC # BLD AUTO: 4.1 X10(6)/MCL (ref 4.7–6.1)
RBC #/AREA URNS AUTO: ABNORMAL /HPF
SODIUM SERPL-SCNC: 140 MMOL/L (ref 136–145)
SP GR UR STRIP.AUTO: 1.01 (ref 1–1.03)
SQUAMOUS #/AREA URNS AUTO: ABNORMAL /HPF
UROBILINOGEN UR STRIP-ACNC: 0.2
WBC # SPEC AUTO: 7.89 X10(3)/MCL (ref 4.5–11.5)
WBC #/AREA URNS AUTO: ABNORMAL /HPF

## 2024-05-17 PROCEDURE — 51798 US URINE CAPACITY MEASURE: CPT

## 2024-05-17 PROCEDURE — 80048 BASIC METABOLIC PNL TOTAL CA: CPT | Performed by: EMERGENCY MEDICINE

## 2024-05-17 PROCEDURE — 99284 EMERGENCY DEPT VISIT MOD MDM: CPT

## 2024-05-17 PROCEDURE — 25000003 PHARM REV CODE 250: Performed by: EMERGENCY MEDICINE

## 2024-05-17 PROCEDURE — 63600175 PHARM REV CODE 636 W HCPCS: Performed by: EMERGENCY MEDICINE

## 2024-05-17 PROCEDURE — 96365 THER/PROPH/DIAG IV INF INIT: CPT | Mod: 59

## 2024-05-17 PROCEDURE — 81003 URINALYSIS AUTO W/O SCOPE: CPT | Performed by: EMERGENCY MEDICINE

## 2024-05-17 PROCEDURE — 85025 COMPLETE CBC W/AUTO DIFF WBC: CPT | Performed by: EMERGENCY MEDICINE

## 2024-05-17 PROCEDURE — 51702 INSERT TEMP BLADDER CATH: CPT

## 2024-05-17 PROCEDURE — 87086 URINE CULTURE/COLONY COUNT: CPT | Performed by: EMERGENCY MEDICINE

## 2024-05-17 RX ADMIN — CEFTRIAXONE SODIUM 1 G: 1 INJECTION, POWDER, FOR SOLUTION INTRAMUSCULAR; INTRAVENOUS at 11:05

## 2024-05-17 RX ADMIN — SODIUM CHLORIDE 500 ML: 9 INJECTION, SOLUTION INTRAVENOUS at 11:05

## 2024-05-18 VITALS
OXYGEN SATURATION: 97 % | WEIGHT: 182 LBS | RESPIRATION RATE: 19 BRPM | TEMPERATURE: 99 F | DIASTOLIC BLOOD PRESSURE: 109 MMHG | SYSTOLIC BLOOD PRESSURE: 213 MMHG | HEIGHT: 72 IN | HEART RATE: 68 BPM | BODY MASS INDEX: 24.65 KG/M2

## 2024-05-18 RX ORDER — CEFUROXIME AXETIL 500 MG/1
500 TABLET ORAL 2 TIMES DAILY
Qty: 20 TABLET | Refills: 0 | Status: SHIPPED | OUTPATIENT
Start: 2024-05-18 | End: 2024-05-28

## 2024-05-18 NOTE — ED PROVIDER NOTES
Encounter Date: 5/17/2024       History     Chief Complaint   Patient presents with    Urinary Retention     Talavera cath not draining.  Was inserted 3 weeks ago for urinary retention.      The patient is a 84-year-old male who presents to the emergency department with nondraining catheter.  Patient had Talavera placed here around 3 weeks ago secondary to urinary retention.  Per family member at bedside patient did follow with urologist in [place], failed a voiding trial in the office in the catheter was replaced.  Patient states that he emptied the bag early this morning, but reports that he has had no urine production all day.  He does report some lower abdominal fullness and feels as though the catheter is clogged.  He denies having any nausea, vomiting, fever or chills.  Family member does report the patient lives alone, she went to check on him throughout the week and then noticed that his urine appeared to be somewhat cloudy.        Review of patient's allergies indicates:  No Known Allergies  No past medical history on file.  No past surgical history on file.  No family history on file.  Social History     Tobacco Use    Smoking status: Never    Smokeless tobacco: Current     Types: Chew   Substance Use Topics    Alcohol use: Yes     Comment: social    Drug use: Never     Review of Systems   Constitutional:  Negative for fever.   HENT:  Negative for sore throat.    Respiratory:  Negative for shortness of breath.    Cardiovascular:  Negative for chest pain.   Gastrointestinal:  Positive for abdominal distention. Negative for nausea.   Genitourinary:  Positive for decreased urine volume. Negative for dysuria.   Musculoskeletal:  Negative for back pain.   Skin:  Negative for rash.   Neurological:  Negative for weakness.   Hematological:  Does not bruise/bleed easily.       Physical Exam     Initial Vitals [05/17/24 2143]   BP Pulse Resp Temp SpO2   (!) 196/100 67 19 98.4 °F (36.9 °C) 97 %      MAP       --          Physical Exam    Nursing note and vitals reviewed.  Constitutional: He appears well-developed and well-nourished.   HENT:   Head: Normocephalic and atraumatic.   Nose: Nose normal.   Eyes: Conjunctivae and EOM are normal. Pupils are equal, round, and reactive to light.   Neck: Neck supple. No thyromegaly present.   Normal range of motion.  Cardiovascular:  Normal rate.     Exam reveals no friction rub.       Pulmonary/Chest: Breath sounds normal. No respiratory distress. He has no wheezes.   Abdominal: Abdomen is soft. He exhibits no distension. There is no abdominal tenderness.   Genitourinary:    Genitourinary Comments: Talavera catheter in placed, no urine noted in tubing or bag.     Musculoskeletal:      Cervical back: Normal range of motion and neck supple.     Lymphadenopathy:     He has no cervical adenopathy.   Neurological: He is alert and oriented to person, place, and time. He has normal strength. No sensory deficit. GCS score is 15. GCS eye subscore is 4. GCS verbal subscore is 5. GCS motor subscore is 6.   Skin: Skin is warm and dry. Capillary refill takes less than 2 seconds.   Psychiatric: He has a normal mood and affect. His behavior is normal. Judgment and thought content normal.         ED Course   Procedures  Labs Reviewed   URINALYSIS, REFLEX TO URINE CULTURE - Abnormal; Notable for the following components:       Result Value    Appearance, UA Cloudy (*)     pH, UA >=9.0 (*)     Protein, UA 3+ (*)     Blood, UA 3+ (*)     Nitrites, UA Positive (*)     Leukocyte Esterase, UA 3+ (*)     All other components within normal limits   URINALYSIS, MICROSCOPIC - Abnormal; Notable for the following components:    Bacteria, UA Moderate (*)     RBC, UA 11-20 (*)     WBC, UA 21-50 (*)     All other components within normal limits   BASIC METABOLIC PANEL - Abnormal; Notable for the following components:    Creatinine 1.47 (*)     All other components within normal limits   CBC WITH DIFFERENTIAL - Abnormal;  Notable for the following components:    RBC 4.10 (*)     Hgb 13.0 (*)     Hct 39.1 (*)     MCV 95.4 (*)     MCH 31.7 (*)     All other components within normal limits   CULTURE, URINE   CBC W/ AUTO DIFFERENTIAL    Narrative:     The following orders were created for panel order CBC auto differential.  Procedure                               Abnormality         Status                     ---------                               -----------         ------                     CBC with Differential[[id number]]       Abnormal            Final result                 Please view results for these tests on the individual orders.          Imaging Results    None          Medications   sodium chloride 0.9% bolus 500 mL 500 mL (0 mLs Intravenous Stopped 5/18/24 0013)   cefTRIAXone (Rocephin) 1 g in dextrose 5 % in water (D5W) 100 mL IVPB (MB+) (0 g Intravenous Stopped 5/18/24 0010)     Medical Decision Making  Amount and/or Complexity of Data Reviewed  Labs: ordered.    Risk  Prescription drug management.               ED Course as of 05/18/24 0033   Fri May 17, 2024   2212 Bladder scan with 562 mL.  Will have rain replaced. [AA]   Sat May 18, 2024   0006 Patient with UTI, thick appearing cloudy urine.  Creatinine improved since the rain was originally placed, no elevated WBC.  Patient given rocephin and fluid bolus here in ED, will discharge with antibiotics for home. [AA]   0032 At time of discharge patient note to be hypertensive.  He is without symptoms and states that he did not take his medications today.  Will discharge patient home with his two family members and he is to take his BP meds when he gets home. [AA]      ED Course User Index  [AA] Blank Shaw MD                           Clinical Impression:  Final diagnoses:  [R33.9] Urinary retention (Primary)  [T83.511A, N39.0] Urinary tract infection associated with indwelling urethral catheter, initial encounter          ED Disposition Condition    Discharge  Stable          ED Prescriptions       Medication Sig Dispense Start Date End Date Auth. Provider    cefUROXime (CEFTIN) 500 MG tablet Take 1 tablet (500 mg total) by mouth 2 (two) times daily. for 10 days 20 tablet 5/18/2024 5/28/2024 Blank Shaw MD          Follow-up Information       Follow up With Specialties Details Why Contact Info    David Cifuentes III, MD Internal Medicine Go in 3 days  [address]  [address]  [place] [address]  [phone]               Blank Shaw MD  05/18/24 0010       Blank Shaw MD  05/18/24 0033

## 2024-05-20 ENCOUNTER — HOSPITAL ENCOUNTER (OUTPATIENT)
Dept: RADIOLOGY | Facility: HOSPITAL | Age: 84
Discharge: HOME | End: 2024-05-20
Payer: MEDICARE

## 2024-05-20 DIAGNOSIS — N40.1 ENLARGED PROSTATE WITH URINARY OBSTRUCTION: ICD-10-CM

## 2024-05-20 DIAGNOSIS — R33.9 RETENTION OF URINE, UNSPECIFIED: ICD-10-CM

## 2024-05-20 DIAGNOSIS — N13.8 ENLARGED PROSTATE WITH URINARY OBSTRUCTION: ICD-10-CM

## 2024-05-20 LAB — BACTERIA UR CULT: ABNORMAL

## 2024-05-20 PROCEDURE — 76857 US EXAM PELVIC LIMITED: CPT | Mod: TC

## 2024-06-05 ENCOUNTER — LAB REQUISITION (OUTPATIENT)
Dept: LAB | Facility: HOSPITAL | Age: 84
End: 2024-06-05
Payer: MEDICARE

## 2024-06-05 DIAGNOSIS — N39.0 URINARY TRACT INFECTION, SITE NOT SPECIFIED: ICD-10-CM

## 2024-06-05 LAB
BACTERIA #/AREA URNS AUTO: ABNORMAL /HPF
BILIRUB UR QL STRIP.AUTO: NEGATIVE
CLARITY UR: ABNORMAL
COLOR UR AUTO: YELLOW
GLUCOSE UR QL STRIP: NEGATIVE
HGB UR QL STRIP: ABNORMAL
KETONES UR QL STRIP: NEGATIVE
LEUKOCYTE ESTERASE UR QL STRIP: ABNORMAL
MUCOUS THREADS URNS QL MICRO: ABNORMAL /LPF
NITRITE UR QL STRIP: NEGATIVE
PH UR STRIP: 6 [PH]
PROT UR QL STRIP: 100
RBC #/AREA URNS AUTO: ABNORMAL /HPF
SP GR UR STRIP.AUTO: 1.02 (ref 1–1.03)
SQUAMOUS #/AREA URNS AUTO: ABNORMAL /HPF
UROBILINOGEN UR STRIP-ACNC: 0.2
WBC #/AREA URNS AUTO: ABNORMAL /HPF

## 2024-06-05 PROCEDURE — 81003 URINALYSIS AUTO W/O SCOPE: CPT | Performed by: INTERNAL MEDICINE

## 2024-06-05 PROCEDURE — 81015 MICROSCOPIC EXAM OF URINE: CPT | Performed by: INTERNAL MEDICINE

## 2024-06-05 PROCEDURE — 87086 URINE CULTURE/COLONY COUNT: CPT | Performed by: INTERNAL MEDICINE

## 2024-06-09 LAB
BACTERIA UR CULT: ABNORMAL
BACTERIA UR CULT: ABNORMAL

## 2024-07-03 ENCOUNTER — LAB REQUISITION (OUTPATIENT)
Dept: LAB | Facility: HOSPITAL | Age: 84
End: 2024-07-03
Payer: MEDICARE

## 2024-07-03 DIAGNOSIS — Z01.810 ENCOUNTER FOR PREPROCEDURAL CARDIOVASCULAR EXAMINATION: ICD-10-CM

## 2024-07-03 LAB
ALBUMIN SERPL-MCNC: 3.4 G/DL (ref 3.4–5)
ALBUMIN/GLOB SERPL: 1.4 RATIO
ALP SERPL-CCNC: 70 UNIT/L (ref 50–144)
ALT SERPL-CCNC: 10 UNIT/L (ref 1–45)
ANION GAP SERPL CALC-SCNC: 6 MEQ/L (ref 2–13)
APTT PPP: 26.1 SECONDS (ref 23–29.4)
AST SERPL-CCNC: 23 UNIT/L (ref 17–59)
BASOPHILS # BLD AUTO: 0.04 X10(3)/MCL (ref 0.01–0.08)
BASOPHILS NFR BLD AUTO: 0.8 % (ref 0.1–1.2)
BILIRUB SERPL-MCNC: 0.4 MG/DL (ref 0–1)
BUN SERPL-MCNC: 31 MG/DL (ref 7–20)
CALCIUM SERPL-MCNC: 9 MG/DL (ref 8.4–10.2)
CHLORIDE SERPL-SCNC: 106 MMOL/L (ref 98–110)
CO2 SERPL-SCNC: 25 MMOL/L (ref 21–32)
CREAT SERPL-MCNC: 1.47 MG/DL (ref 0.66–1.25)
CREAT/UREA NIT SERPL: 21 (ref 12–20)
EOSINOPHIL # BLD AUTO: 0.38 X10(3)/MCL (ref 0.04–0.54)
EOSINOPHIL NFR BLD AUTO: 7.7 % (ref 0.7–7)
ERYTHROCYTE [DISTWIDTH] IN BLOOD BY AUTOMATED COUNT: 12.4 %
GFR SERPLBLD CREATININE-BSD FMLA CKD-EPI: 46 ML/MIN/1.73/M2
GLOBULIN SER-MCNC: 2.5 GM/DL (ref 2–3.9)
GLUCOSE SERPL-MCNC: 87 MG/DL (ref 70–115)
HCT VFR BLD AUTO: 33.4 % (ref 36–52)
HGB BLD-MCNC: 10.9 G/DL (ref 13–18)
IMM GRANULOCYTES # BLD AUTO: 0.01 X10(3)/MCL (ref 0–0.03)
IMM GRANULOCYTES NFR BLD AUTO: 0.2 % (ref 0–0.5)
INR PPP: 1
LYMPHOCYTES # BLD AUTO: 1.7 X10(3)/MCL (ref 1.32–3.57)
LYMPHOCYTES NFR BLD AUTO: 34.3 % (ref 20–55)
MCH RBC QN AUTO: 30.6 PG (ref 27–34)
MCHC RBC AUTO-ENTMCNC: 32.6 G/DL (ref 31–37)
MCV RBC AUTO: 93.8 FL (ref 79–99)
MONOCYTES # BLD AUTO: 0.47 X10(3)/MCL (ref 0.3–0.82)
MONOCYTES NFR BLD AUTO: 9.5 % (ref 4.7–12.5)
NEUTROPHILS # BLD AUTO: 2.36 X10(3)/MCL (ref 1.78–5.38)
NEUTROPHILS NFR BLD AUTO: 47.5 % (ref 37–73)
NRBC BLD AUTO-RTO: 0 %
PLATELET # BLD AUTO: 177 X10(3)/MCL (ref 140–371)
PMV BLD AUTO: 10.4 FL (ref 9.4–12.4)
POTASSIUM SERPL-SCNC: 5.1 MMOL/L (ref 3.5–5.1)
PROT SERPL-MCNC: 5.9 GM/DL (ref 6.3–8.2)
PROTHROMBIN TIME: 10.4 SECONDS (ref 9.3–11.9)
RBC # BLD AUTO: 3.56 X10(6)/MCL (ref 4–6)
SODIUM SERPL-SCNC: 137 MMOL/L (ref 136–145)
WBC # BLD AUTO: 4.96 X10(3)/MCL (ref 4–11.5)

## 2024-07-03 PROCEDURE — 85610 PROTHROMBIN TIME: CPT | Performed by: INTERNAL MEDICINE

## 2024-07-03 PROCEDURE — 85025 COMPLETE CBC W/AUTO DIFF WBC: CPT | Performed by: INTERNAL MEDICINE

## 2024-07-03 PROCEDURE — 85730 THROMBOPLASTIN TIME PARTIAL: CPT | Performed by: INTERNAL MEDICINE

## 2024-07-03 PROCEDURE — 80053 COMPREHEN METABOLIC PANEL: CPT | Performed by: INTERNAL MEDICINE

## 2024-07-15 ENCOUNTER — LAB REQUISITION (OUTPATIENT)
Dept: LAB | Facility: HOSPITAL | Age: 84
End: 2024-07-15
Payer: MEDICARE

## 2024-07-15 DIAGNOSIS — N39.0 URINARY TRACT INFECTION, SITE NOT SPECIFIED: ICD-10-CM

## 2024-07-15 LAB
BACTERIA #/AREA URNS AUTO: NORMAL /HPF
BILIRUB UR QL STRIP.AUTO: NEGATIVE
CLARITY UR: CLEAR
COLOR UR AUTO: YELLOW
GLUCOSE UR QL STRIP: NEGATIVE
HGB UR QL STRIP: ABNORMAL
KETONES UR QL STRIP: NEGATIVE
LEUKOCYTE ESTERASE UR QL STRIP: ABNORMAL
NITRITE UR QL STRIP: NEGATIVE
PH UR STRIP: 6 [PH]
PROT UR QL STRIP: 30
RBC #/AREA URNS AUTO: NORMAL /HPF
SP GR UR STRIP.AUTO: 1.01 (ref 1–1.03)
SQUAMOUS #/AREA URNS AUTO: NORMAL /HPF
UROBILINOGEN UR STRIP-ACNC: 1
WBC #/AREA URNS AUTO: NORMAL /HPF

## 2024-07-15 PROCEDURE — 81015 MICROSCOPIC EXAM OF URINE: CPT | Performed by: INTERNAL MEDICINE

## 2024-07-15 PROCEDURE — 87184 SC STD DISK METHOD PER PLATE: CPT | Performed by: INTERNAL MEDICINE

## 2024-07-15 PROCEDURE — 87181 SC STD AGAR DILUTION PER AGT: CPT | Performed by: INTERNAL MEDICINE

## 2024-07-15 PROCEDURE — 81003 URINALYSIS AUTO W/O SCOPE: CPT | Performed by: INTERNAL MEDICINE

## 2024-07-17 ENCOUNTER — CLINICAL SUPPORT (OUTPATIENT)
Dept: RESPIRATORY THERAPY | Facility: HOSPITAL | Age: 84
End: 2024-07-17
Attending: INTERNAL MEDICINE
Payer: MEDICARE

## 2024-07-17 DIAGNOSIS — Z01.818 PRE-OP TESTING: ICD-10-CM

## 2024-07-17 DIAGNOSIS — Z01.818 PRE-OP TESTING: Primary | ICD-10-CM

## 2024-07-17 LAB
OHS QRS DURATION: 88 MS
OHS QTC CALCULATION: 399 MS

## 2024-07-17 PROCEDURE — 93010 ELECTROCARDIOGRAM REPORT: CPT | Mod: ,,, | Performed by: INTERNAL MEDICINE

## 2024-07-17 PROCEDURE — 93005 ELECTROCARDIOGRAM TRACING: CPT

## 2024-07-21 LAB
BACTERIA UR CULT: ABNORMAL
BACTERIA UR CULT: ABNORMAL

## 2024-08-12 ENCOUNTER — PATIENT MESSAGE (OUTPATIENT)
Dept: SURGERY | Facility: HOSPITAL | Age: 84
End: 2024-08-12
Payer: MEDICARE

## 2024-08-26 ENCOUNTER — LAB REQUISITION (OUTPATIENT)
Dept: LAB | Facility: HOSPITAL | Age: 84
End: 2024-08-26
Payer: MEDICARE

## 2024-08-26 DIAGNOSIS — N39.0 URINARY TRACT INFECTION, SITE NOT SPECIFIED: ICD-10-CM

## 2024-08-26 LAB
BACTERIA #/AREA URNS AUTO: ABNORMAL /HPF
BILIRUB UR QL STRIP.AUTO: ABNORMAL
CLARITY UR: ABNORMAL
COLOR UR AUTO: YELLOW
GLUCOSE UR QL STRIP: NEGATIVE
HGB UR QL STRIP: ABNORMAL
KETONES UR QL STRIP: ABNORMAL
LEUKOCYTE ESTERASE UR QL STRIP: ABNORMAL
NITRITE UR QL STRIP: POSITIVE
PH UR STRIP: 6 [PH]
PROT UR QL STRIP: 100
RBC #/AREA URNS AUTO: ABNORMAL /HPF
SP GR UR STRIP.AUTO: >=1.03 (ref 1–1.03)
SQUAMOUS #/AREA URNS AUTO: ABNORMAL /HPF
UROBILINOGEN UR STRIP-ACNC: 0.2
WBC #/AREA URNS AUTO: >100 /HPF
WBC CLUMPS UR QL AUTO: ABNORMAL

## 2024-08-26 PROCEDURE — 87186 SC STD MICRODIL/AGAR DIL: CPT | Performed by: INTERNAL MEDICINE

## 2024-08-26 PROCEDURE — 87086 URINE CULTURE/COLONY COUNT: CPT | Performed by: INTERNAL MEDICINE

## 2024-08-26 PROCEDURE — 81015 MICROSCOPIC EXAM OF URINE: CPT | Performed by: INTERNAL MEDICINE

## 2024-08-26 PROCEDURE — 81003 URINALYSIS AUTO W/O SCOPE: CPT | Performed by: INTERNAL MEDICINE

## 2024-08-28 LAB — BACTERIA UR CULT: ABNORMAL

## 2024-08-29 ENCOUNTER — LAB REQUISITION (OUTPATIENT)
Dept: LAB | Facility: HOSPITAL | Age: 84
End: 2024-08-29
Payer: MEDICARE

## 2024-08-29 DIAGNOSIS — Z01.818 ENCOUNTER FOR OTHER PREPROCEDURAL EXAMINATION: ICD-10-CM

## 2024-08-29 LAB
ANION GAP SERPL CALC-SCNC: 5 MEQ/L (ref 2–13)
BASOPHILS # BLD AUTO: 0.04 X10(3)/MCL (ref 0.01–0.08)
BASOPHILS NFR BLD AUTO: 0.8 % (ref 0.1–1.2)
BUN SERPL-MCNC: 38 MG/DL (ref 7–20)
CALCIUM SERPL-MCNC: 9.6 MG/DL (ref 8.4–10.2)
CHLORIDE SERPL-SCNC: 110 MMOL/L (ref 98–110)
CO2 SERPL-SCNC: 23 MMOL/L (ref 21–32)
CREAT SERPL-MCNC: 1.8 MG/DL (ref 0.66–1.25)
CREAT/UREA NIT SERPL: 21 (ref 12–20)
EOSINOPHIL # BLD AUTO: 0.19 X10(3)/MCL (ref 0.04–0.54)
EOSINOPHIL NFR BLD AUTO: 3.7 % (ref 0.7–7)
ERYTHROCYTE [DISTWIDTH] IN BLOOD BY AUTOMATED COUNT: 13.2 %
GFR SERPLBLD CREATININE-BSD FMLA CKD-EPI: 36 ML/MIN/1.73/M2
GLUCOSE SERPL-MCNC: 90 MG/DL (ref 70–115)
HCT VFR BLD AUTO: 30.5 % (ref 36–52)
HGB BLD-MCNC: 10.3 G/DL (ref 13–18)
IMM GRANULOCYTES # BLD AUTO: 0.01 X10(3)/MCL (ref 0–0.03)
IMM GRANULOCYTES NFR BLD AUTO: 0.2 % (ref 0–0.5)
LYMPHOCYTES # BLD AUTO: 1.24 X10(3)/MCL (ref 1.32–3.57)
LYMPHOCYTES NFR BLD AUTO: 24.4 % (ref 20–55)
MCH RBC QN AUTO: 30.7 PG (ref 27–34)
MCHC RBC AUTO-ENTMCNC: 33.8 G/DL (ref 31–37)
MCV RBC AUTO: 90.8 FL (ref 79–99)
MONOCYTES # BLD AUTO: 0.58 X10(3)/MCL (ref 0.3–0.82)
MONOCYTES NFR BLD AUTO: 11.4 % (ref 4.7–12.5)
NEUTROPHILS # BLD AUTO: 3.03 X10(3)/MCL (ref 1.78–5.38)
NEUTROPHILS NFR BLD AUTO: 59.5 % (ref 37–73)
NRBC BLD AUTO-RTO: 0 %
PLATELET # BLD AUTO: 287 X10(3)/MCL (ref 140–371)
PMV BLD AUTO: 10 FL (ref 9.4–12.4)
POTASSIUM SERPL-SCNC: 5.8 MMOL/L (ref 3.5–5.1)
RBC # BLD AUTO: 3.36 X10(6)/MCL (ref 4–6)
SODIUM SERPL-SCNC: 138 MMOL/L (ref 136–145)
WBC # BLD AUTO: 5.09 X10(3)/MCL (ref 4–11.5)

## 2024-08-29 PROCEDURE — 85025 COMPLETE CBC W/AUTO DIFF WBC: CPT | Performed by: INTERNAL MEDICINE

## 2024-08-29 PROCEDURE — 80048 BASIC METABOLIC PNL TOTAL CA: CPT | Performed by: INTERNAL MEDICINE

## 2024-09-04 ENCOUNTER — HOSPITAL ENCOUNTER (EMERGENCY)
Facility: HOSPITAL | Age: 84
Discharge: HOME | End: 2024-09-05
Attending: FAMILY MEDICINE
Payer: MEDICARE

## 2024-09-04 DIAGNOSIS — N17.9 ACUTE RENAL FAILURE, UNSPECIFIED ACUTE RENAL FAILURE TYPE: Primary | ICD-10-CM

## 2024-09-04 DIAGNOSIS — E87.5 HYPERKALEMIA: ICD-10-CM

## 2024-09-04 LAB
ALBUMIN SERPL-MCNC: 3.7 G/DL (ref 3.4–5)
ALBUMIN/GLOB SERPL: 1.3 RATIO
ALP SERPL-CCNC: 94 UNIT/L (ref 50–144)
ALT SERPL-CCNC: 23 UNIT/L (ref 1–45)
ANION GAP SERPL CALC-SCNC: 5 MEQ/L (ref 2–13)
ANION GAP SERPL CALC-SCNC: 5 MEQ/L (ref 2–13)
AST SERPL-CCNC: 36 UNIT/L (ref 17–59)
BASOPHILS # BLD AUTO: 0.04 X10(3)/MCL (ref 0.01–0.08)
BASOPHILS NFR BLD AUTO: 0.8 % (ref 0.1–1.2)
BILIRUB SERPL-MCNC: 0.2 MG/DL (ref 0–1)
BUN SERPL-MCNC: 33 MG/DL (ref 7–20)
BUN SERPL-MCNC: 33 MG/DL (ref 7–20)
CALCIUM SERPL-MCNC: 9.4 MG/DL (ref 8.4–10.2)
CALCIUM SERPL-MCNC: 9.5 MG/DL (ref 8.4–10.2)
CHLORIDE SERPL-SCNC: 110 MMOL/L (ref 98–110)
CHLORIDE SERPL-SCNC: 111 MMOL/L (ref 98–110)
CO2 SERPL-SCNC: 17 MMOL/L (ref 21–32)
CO2 SERPL-SCNC: 22 MMOL/L (ref 21–32)
CREAT SERPL-MCNC: 2.06 MG/DL (ref 0.66–1.25)
CREAT SERPL-MCNC: 2.11 MG/DL (ref 0.66–1.25)
CREAT/UREA NIT SERPL: 16 (ref 12–20)
CREAT/UREA NIT SERPL: 16 (ref 12–20)
EOSINOPHIL # BLD AUTO: 0.19 X10(3)/MCL (ref 0.04–0.54)
EOSINOPHIL NFR BLD AUTO: 3.6 % (ref 0.7–7)
ERYTHROCYTE [DISTWIDTH] IN BLOOD BY AUTOMATED COUNT: 13.8 %
GFR SERPLBLD CREATININE-BSD FMLA CKD-EPI: 30 ML/MIN/1.73/M2
GFR SERPLBLD CREATININE-BSD FMLA CKD-EPI: 31 ML/MIN/1.73/M2
GLOBULIN SER-MCNC: 2.8 GM/DL (ref 2–3.9)
GLUCOSE SERPL-MCNC: 102 MG/DL (ref 70–115)
GLUCOSE SERPL-MCNC: 95 MG/DL (ref 70–115)
HCT VFR BLD AUTO: 33.6 % (ref 36–52)
HGB BLD-MCNC: 11.2 G/DL (ref 13–18)
IMM GRANULOCYTES # BLD AUTO: 0.02 X10(3)/MCL (ref 0–0.03)
IMM GRANULOCYTES NFR BLD AUTO: 0.4 % (ref 0–0.5)
LYMPHOCYTES # BLD AUTO: 1.44 X10(3)/MCL (ref 1.32–3.57)
LYMPHOCYTES NFR BLD AUTO: 27.6 % (ref 20–55)
MAGNESIUM SERPL-MCNC: 2.2 MG/DL (ref 1.8–2.4)
MCH RBC QN AUTO: 30.5 PG (ref 27–34)
MCHC RBC AUTO-ENTMCNC: 33.3 G/DL (ref 31–37)
MCV RBC AUTO: 91.6 FL (ref 79–99)
MONOCYTES # BLD AUTO: 0.64 X10(3)/MCL (ref 0.3–0.82)
MONOCYTES NFR BLD AUTO: 12.3 % (ref 4.7–12.5)
NEUTROPHILS # BLD AUTO: 2.89 X10(3)/MCL (ref 1.78–5.38)
NEUTROPHILS NFR BLD AUTO: 55.3 % (ref 37–73)
NRBC BLD AUTO-RTO: 0 %
PLATELET # BLD AUTO: 208 X10(3)/MCL (ref 140–371)
PMV BLD AUTO: 9.5 FL (ref 9.4–12.4)
POTASSIUM SERPL-SCNC: 5.5 MMOL/L (ref 3.5–5.1)
POTASSIUM SERPL-SCNC: 6 MMOL/L (ref 3.5–5.1)
PROT SERPL-MCNC: 6.5 GM/DL (ref 6.3–8.2)
RBC # BLD AUTO: 3.67 X10(6)/MCL (ref 4–6)
SODIUM SERPL-SCNC: 132 MMOL/L (ref 136–145)
SODIUM SERPL-SCNC: 138 MMOL/L (ref 136–145)
TROPONIN I SERPL-MCNC: <0.012 NG/ML (ref 0–0.03)
WBC # BLD AUTO: 5.22 X10(3)/MCL (ref 4–11.5)

## 2024-09-04 PROCEDURE — 83735 ASSAY OF MAGNESIUM: CPT | Performed by: FAMILY MEDICINE

## 2024-09-04 PROCEDURE — 93005 ELECTROCARDIOGRAM TRACING: CPT

## 2024-09-04 PROCEDURE — 96365 THER/PROPH/DIAG IV INF INIT: CPT

## 2024-09-04 PROCEDURE — 80053 COMPREHEN METABOLIC PANEL: CPT | Performed by: FAMILY MEDICINE

## 2024-09-04 PROCEDURE — 96361 HYDRATE IV INFUSION ADD-ON: CPT

## 2024-09-04 PROCEDURE — 25000003 PHARM REV CODE 250: Mod: JZ,JG | Performed by: FAMILY MEDICINE

## 2024-09-04 PROCEDURE — 93010 ELECTROCARDIOGRAM REPORT: CPT | Mod: ,,, | Performed by: INTERNAL MEDICINE

## 2024-09-04 PROCEDURE — 96375 TX/PRO/DX INJ NEW DRUG ADDON: CPT

## 2024-09-04 PROCEDURE — 84484 ASSAY OF TROPONIN QUANT: CPT | Performed by: FAMILY MEDICINE

## 2024-09-04 PROCEDURE — 25000242 PHARM REV CODE 250 ALT 637 W/ HCPCS: Performed by: FAMILY MEDICINE

## 2024-09-04 PROCEDURE — 94640 AIRWAY INHALATION TREATMENT: CPT

## 2024-09-04 PROCEDURE — 85025 COMPLETE CBC W/AUTO DIFF WBC: CPT | Performed by: FAMILY MEDICINE

## 2024-09-04 PROCEDURE — 94761 N-INVAS EAR/PLS OXIMETRY MLT: CPT

## 2024-09-04 PROCEDURE — 96366 THER/PROPH/DIAG IV INF ADDON: CPT

## 2024-09-04 PROCEDURE — 99285 EMERGENCY DEPT VISIT HI MDM: CPT | Mod: 25

## 2024-09-04 RX ORDER — CALCIUM GLUCONATE 10 MG/ML
1 INJECTION, SOLUTION INTRAVENOUS
Status: COMPLETED | OUTPATIENT
Start: 2024-09-04 | End: 2024-09-04

## 2024-09-04 RX ORDER — INDOMETHACIN 25 MG/1
50 CAPSULE ORAL
Status: COMPLETED | OUTPATIENT
Start: 2024-09-04 | End: 2024-09-04

## 2024-09-04 RX ORDER — IPRATROPIUM BROMIDE AND ALBUTEROL SULFATE 2.5; .5 MG/3ML; MG/3ML
3 SOLUTION RESPIRATORY (INHALATION)
Status: COMPLETED | OUTPATIENT
Start: 2024-09-04 | End: 2024-09-04

## 2024-09-04 RX ADMIN — SODIUM CHLORIDE 1000 ML: 9 INJECTION, SOLUTION INTRAVENOUS at 08:09

## 2024-09-04 RX ADMIN — SODIUM CHLORIDE 1000 ML: 9 INJECTION, SOLUTION INTRAVENOUS at 10:09

## 2024-09-04 RX ADMIN — IPRATROPIUM BROMIDE AND ALBUTEROL SULFATE 3 ML: .5; 3 SOLUTION RESPIRATORY (INHALATION) at 08:09

## 2024-09-04 RX ADMIN — SODIUM BICARBONATE 50 MEQ: 84 INJECTION, SOLUTION INTRAVENOUS at 08:09

## 2024-09-04 RX ADMIN — CALCIUM GLUCONATE 1 G: 10 INJECTION, SOLUTION INTRAVENOUS at 08:09

## 2024-09-04 RX ADMIN — SODIUM POLYSTYRENE SULFONATE 30 G: 15 SUSPENSION ORAL; RECTAL at 08:09

## 2024-09-05 VITALS
HEART RATE: 57 BPM | TEMPERATURE: 99 F | SYSTOLIC BLOOD PRESSURE: 163 MMHG | RESPIRATION RATE: 14 BRPM | BODY MASS INDEX: 23.75 KG/M2 | DIASTOLIC BLOOD PRESSURE: 92 MMHG | OXYGEN SATURATION: 98 % | WEIGHT: 175.31 LBS | HEIGHT: 72 IN

## 2024-09-05 LAB
ANION GAP SERPL CALC-SCNC: 4 MEQ/L (ref 2–13)
BUN SERPL-MCNC: 31 MG/DL (ref 7–20)
CALCIUM SERPL-MCNC: 9.6 MG/DL (ref 8.4–10.2)
CHLORIDE SERPL-SCNC: 112 MMOL/L (ref 98–110)
CO2 SERPL-SCNC: 21 MMOL/L (ref 21–32)
CREAT SERPL-MCNC: 2.07 MG/DL (ref 0.66–1.25)
CREAT/UREA NIT SERPL: 15 (ref 12–20)
GFR SERPLBLD CREATININE-BSD FMLA CKD-EPI: 31 ML/MIN/1.73/M2
GLUCOSE SERPL-MCNC: 95 MG/DL (ref 70–115)
OHS QRS DURATION: 92 MS
OHS QTC CALCULATION: 408 MS
POTASSIUM SERPL-SCNC: 5.5 MMOL/L (ref 3.5–5.1)
SODIUM SERPL-SCNC: 137 MMOL/L (ref 136–145)

## 2024-09-05 PROCEDURE — 25000003 PHARM REV CODE 250: Performed by: FAMILY MEDICINE

## 2024-09-05 RX ADMIN — SODIUM POLYSTYRENE SULFONATE 30 G: 15 SUSPENSION ORAL; RECTAL at 12:09

## 2024-09-05 NOTE — ED PROVIDER NOTES
Encounter Date: 9/4/2024       History     Chief Complaint   Patient presents with    high potassium     6.6 [facility] - [facility] other [facility]     Patient presents for evaluation of hyperkalemia.  Patient is without complaint at present.  Patient is sent from primary care physician for evaluation.  Patient denies having any other complaints at present.  Denies having any symptoms of hyperkalemia.    The history is provided by the patient.     Review of patient's allergies indicates:  No Known Allergies  Past Medical History:   Diagnosis Date    BPH with urinary obstruction     Drusen (degenerative) of macula, bilateral     Elevated PSA     HLD (hyperlipidemia)     Hypertension     Indwelling urinary catheter present     Nonrheumatic mitral (valve) insufficiency     Occlusion and stenosis of bilateral carotid arteries     Pain     Parkinsonism     Peripheral vascular disease     Transient retinal artery occlusion, unspecified eye     Urinary tract infection, site not specified     Uses walker     Uses wheelchair      Past Surgical History:   Procedure Laterality Date    COLON SURGERY  2016    HERNIA REPAIR  1986     No family history on file.  Social History     Tobacco Use    Smoking status: Former     Types: Cigarettes    Smokeless tobacco: Current     Types: Chew   Substance Use Topics    Alcohol use: Yes     Comment: social    Drug use: Never     Review of Systems   Constitutional: Negative.    HENT: Negative.     Eyes: Negative.    Respiratory: Negative.     Cardiovascular: Negative.    Gastrointestinal: Negative.    Endocrine: Negative.    Genitourinary: Negative.    Musculoskeletal: Negative.    Skin: Negative.    Allergic/Immunologic: Negative.    Neurological: Negative.    Hematological: Negative.    Psychiatric/Behavioral: Negative.         Physical Exam     Initial Vitals [09/04/24 1901]   BP Pulse Resp Temp SpO2   (!) 164/84 (!) 56 20 98.7 °F (37.1 °C) 99 %      MAP       --         Physical  Exam    Vitals reviewed.  Constitutional: He appears well-developed and well-nourished. He is not diaphoretic. No distress.   HENT:   Head: Normocephalic and atraumatic.   Mouth/Throat: No oropharyngeal exudate.   Eyes: EOM are normal. Pupils are equal, round, and reactive to light. Right eye exhibits no discharge. Left eye exhibits no discharge. No scleral icterus.   Neck: Neck supple. No thyromegaly present. No tracheal deviation present.   Normal range of motion.  Cardiovascular:  Normal rate, regular rhythm and normal heart sounds.           No murmur heard.  Pulmonary/Chest: Breath sounds normal. No stridor. No respiratory distress. He has no wheezes. He has no rhonchi. He has no rales.   Abdominal: Abdomen is soft. Bowel sounds are normal. He exhibits no distension. There is no abdominal tenderness. There is no rebound.   Musculoskeletal:         General: No tenderness or edema. Normal range of motion.      Cervical back: Normal range of motion and neck supple.     Neurological: He is alert and oriented to person, place, and time. He has normal reflexes. He displays normal reflexes. No cranial nerve deficit or sensory deficit. GCS score is 15. GCS eye subscore is 4. GCS verbal subscore is 5. GCS motor subscore is 6.   Skin: Skin is warm. Capillary refill takes less than 2 seconds. No rash noted. No erythema.   Psychiatric: He has a normal mood and affect.         ED Course   Procedures  Labs Reviewed   COMPREHENSIVE METABOLIC PANEL - Abnormal       Result Value    Sodium 132 (*)     Potassium 6.0 (*)     Chloride 110      CO2 17 (*)     Glucose 95      Blood Urea Nitrogen 33 (*)     Creatinine 2.11 (*)     Calcium 9.5      Protein Total 6.5      Albumin 3.7      Globulin 2.8      Albumin/Globulin Ratio 1.3      Bilirubin Total 0.2      ALP 94      ALT 23      AST 36      eGFR 30      Anion Gap 5.0      BUN/Creatinine Ratio 16     CBC WITH DIFFERENTIAL - Abnormal    WBC 5.22      RBC 3.67 (*)     Hgb 11.2 (*)      Hct 33.6 (*)     MCV 91.6      MCH 30.5      MCHC 33.3      RDW 13.8      Platelet 208      MPV 9.5      Neut % 55.3      Lymph % 27.6      Mono % 12.3      Eos % 3.6      Basophil % 0.8      Lymph # 1.44      Neut # 2.89      Mono # 0.64      Eos # 0.19      Baso # 0.04      IG# 0.02      IG% 0.4      NRBC% 0.0     BASIC METABOLIC PANEL - Abnormal    Sodium 138      Potassium 5.5 (*)     Chloride 111 (*)     CO2 22      Glucose 102      Blood Urea Nitrogen 33 (*)     Creatinine 2.06 (*)     BUN/Creatinine Ratio 16      Calcium 9.4      Anion Gap 5.0      eGFR 31     BASIC METABOLIC PANEL - Abnormal    Sodium 137      Potassium 5.5 (*)     Chloride 112 (*)     CO2 21      Glucose 95      Blood Urea Nitrogen 31 (*)     Creatinine 2.07 (*)     BUN/Creatinine Ratio 15      Calcium 9.6      Anion Gap 4.0      eGFR 31     MAGNESIUM - Normal    Magnesium Level 2.20     TROPONIN I - Normal    Troponin-I <0.012     CBC W/ AUTO DIFFERENTIAL    Narrative:     The following orders were created for panel order CBC auto differential.  Procedure                               Abnormality         Status                     ---------                               -----------         ------                     CBC with Differential[[id number]]       Abnormal            Final result                 Please view results for these tests on the individual orders.     EKG Readings: (Independently Interpreted)   Initial Reading: No STEMI. ST Segments: Non-Specific ST Segment Depression. T Waves: Normal.   Sinus bradycardia at 58 beats per minute.  Nonspecific ST-T changes present.  No signs of hyperkalemia on EKG.       Imaging Results              CT Abdomen Pelvis  Without Contrast (Preliminary result)  Result time 09/04/24 23:06:39      Preliminary result by Soto Vallejo Jr., MD (09/04/24 23:06:39)                   Narrative:    START OF REPORT:  Technique: CT of the abdomen and pelvis was performed with axial images as well  as sagittal and coronal reconstruction images without intravenous contrast.    Comparison: Comparison is with study dated 2018-04-24 10:12:05.    Clinical History: High potassium ([facility] - no other c/[facility]) acute renal failure.    Dosage Information: Automated Exposure Control was utilized.    Findings:  Lines and Tubes: None.  Thorax:  Lungs: There is mild nonspecific dependent change at the lung bases. No focal infiltrate or consolidation is seen.  Pleura: No effusions or thickening.  Heart: The heart size is within normal limits. Moderate coronary artery calcification is seen.  Abdomen:  Abdominal Wall: No abdominal wall pathology is seen.  Liver: The liver appears unremarkable.  Biliary System: No intrahepatic or extrahepatic biliary duct dilatation is seen.  Gallbladder: The gallbladder appears unremarkable.  Pancreas: The pancreas appears unremarkable.  Spleen: The spleen appears unremarkable.  Adrenals: The adrenal glands appear unremarkable.  Kidneys: Both kidneys are small but otherwise unremarkable with no stones, cysts, masses or hydronephrosis.  Aorta: There is moderate calcification of the abdominal aorta and its branches. There is stable dilatation at the distal abdominal aorta measuring up to 4.4 cm, and mild dilatation of the bilateral common iliac vessels.  IVC: Unremarkable.  Bowel:  Esophagus: The visualized esophagus appears unremarkable.  Stomach: The stomach appears unremarkable.  Duodenum: Unremarkable appearing duodenum.  Small Bowel: The small bowel appears unremarkable.  Colon: Nondistended. Extensive diverticula are seen throughout the colon. No associated inflammatory stranding or pericolonic fluid is seen to suggest diverticulitis.  Appendix: The appendix is not identified but no inflammatory changes are seen in the right lower quadrant to suggest appendicitis.  Peritoneum: No intraperitoneal free air or ascites is seen.    Pelvis:  Bladder: The bladder is nondistended  and cannot be definitively evaluated.  Male:  Prostate gland: The prostate gland is moderately enlarged with its median lobe projecting into the bladder base.    Bony structures:  Dorsal Spine: There is moderate to severe spondylosis of the visualized dorsal spine.  Bony Pelvis: There is moderate degenerative change of the bilateral hips. An intramedullary jewel is seen in the right femur.      Impression:  1. Both kidneys are small but otherwise unremarkable with no stones, cysts, masses or hydronephrosis. Correlate with clinical and laboratory parameters.  2. Extensive diverticula are seen throughout the colon. No associated inflammatory stranding or pericolonic fluid is seen to suggest diverticulitis.  3. No acute intraabdominal or pelvic solid organ or bowel pathology identified. Details and other findings as discussed above.                                         Medications   sodium polystyrene sulfonate 15 gram/60 mL 0.25 g/mL oral liquid 30 g (has no administration in time range)   calcium gluc in NS 1 G IVPB (premixed) (0 g Intravenous Stopped 9/4/24 2230)   sodium polystyrene sulfonate 15 gram/60 mL 0.25 g/mL oral liquid 30 g (30 g Oral Given 9/4/24 2028)   albuterol-ipratropium 2.5 mg-0.5 mg/3 mL nebulizer solution 3 mL (3 mLs Nebulization Given 9/4/24 2009)   sodium chloride 0.9% bolus 1,000 mL 1,000 mL (0 mLs Intravenous Stopped 9/4/24 2138)   sodium bicarbonate solution 50 mEq (50 mEq Intravenous Given 9/4/24 2028)   sodium chloride 0.9% bolus 1,000 mL 1,000 mL (1,000 mLs Intravenous New Bag 9/4/24 2229)     Medical Decision Making  Amount and/or Complexity of Data Reviewed  Labs: ordered.  Radiology: ordered.    Risk  Prescription drug management.                                      Clinical Impression:  Final diagnoses:  [E87.5] Hyperkalemia  [N17.9] Acute renal failure, unspecified acute renal failure type (Primary)          ED Disposition Condition    Discharge Stable          ED Prescriptions     None       Follow-up Information    None          Kt Cano MD  09/05/24 0026

## 2024-09-12 ENCOUNTER — LAB REQUISITION (OUTPATIENT)
Dept: LAB | Facility: HOSPITAL | Age: 84
End: 2024-09-12
Payer: MEDICARE

## 2024-09-12 DIAGNOSIS — E87.5 HYPERKALEMIA: ICD-10-CM

## 2024-09-12 LAB
ANION GAP SERPL CALC-SCNC: 5 MEQ/L (ref 2–13)
BUN SERPL-MCNC: 25 MG/DL (ref 7–20)
CALCIUM SERPL-MCNC: 9.4 MG/DL (ref 8.4–10.2)
CHLORIDE SERPL-SCNC: 109 MMOL/L (ref 98–110)
CO2 SERPL-SCNC: 22 MMOL/L (ref 21–32)
CREAT SERPL-MCNC: 1.33 MG/DL (ref 0.66–1.25)
CREAT/UREA NIT SERPL: 19 (ref 12–20)
GFR SERPLBLD CREATININE-BSD FMLA CKD-EPI: 52 ML/MIN/1.73/M2
GLUCOSE SERPL-MCNC: 88 MG/DL (ref 70–115)
POTASSIUM SERPL-SCNC: 4.5 MMOL/L (ref 3.5–5.1)
SODIUM SERPL-SCNC: 136 MMOL/L (ref 136–145)

## 2024-09-12 PROCEDURE — 80048 BASIC METABOLIC PNL TOTAL CA: CPT | Performed by: INTERNAL MEDICINE

## 2024-09-19 ENCOUNTER — ANESTHESIA EVENT (OUTPATIENT)
Dept: SURGERY | Facility: HOSPITAL | Age: 84
End: 2024-09-19
Payer: MEDICARE

## 2024-09-23 RX ORDER — TRAMADOL HYDROCHLORIDE 50 MG/1
50 TABLET ORAL EVERY 6 HOURS PRN
COMMUNITY
Start: 2024-06-13

## 2024-09-23 RX ORDER — TAMSULOSIN HYDROCHLORIDE 0.4 MG/1
0.4 CAPSULE ORAL DAILY
COMMUNITY
Start: 2024-06-13

## 2024-09-23 RX ORDER — METOPROLOL SUCCINATE 25 MG/1
25 TABLET, EXTENDED RELEASE ORAL 2 TIMES DAILY
COMMUNITY
Start: 2024-06-13

## 2024-09-23 RX ORDER — GABAPENTIN 300 MG/1
300 CAPSULE ORAL NIGHTLY
COMMUNITY
Start: 2024-06-13

## 2024-09-23 RX ORDER — FINASTERIDE 5 MG/1
5 TABLET, FILM COATED ORAL DAILY
COMMUNITY
Start: 2024-08-23

## 2024-09-23 RX ORDER — VALSARTAN 160 MG/1
160 TABLET ORAL DAILY
COMMUNITY

## 2024-09-23 RX ORDER — ROSUVASTATIN CALCIUM 5 MG/1
5 TABLET, COATED ORAL NIGHTLY
COMMUNITY
Start: 2024-06-13

## 2024-09-23 RX ORDER — MULTIVIT WITH IRON,MINERALS
2 TABLET ORAL DAILY
COMMUNITY
Start: 2024-08-29

## 2024-09-23 NOTE — CLINICAL REVIEW
labs/EKG reviewed. CTAbd reviewed. Telephone confirmation that patient is without s/s of PNA ie. cough, fever, diarrhea, gastrointestinal/appetite changes, sob, abnormal fatigue etc. cardiology notes utd. Echo/Carotid noted. CRA provided. chart review complete. ccv

## 2024-09-30 NOTE — PRE-PROCEDURE INSTRUCTIONS
"[facility]: Outpatient Surgery  Preprocedure Check-In Instructions     Your arrival time for your surgery or procedure is ___1000___.  We ask patients to arrive about 2 hours before surgery to allow for enough time to review your health history & medications, start your IV, complete any outstanding labwork or tests, and meet your Anesthesiologist.    Expectations: "Because of inconsistent procedure completion times, an unexpected wait may occur. The Physicians would like you to be here to prepare in the event they run ahead of time. We will make you as comfortable as possible and keep you informed. We apologize in advance if this happens."    You will arrive at [facility], [address], [place].  Enter through the [place] entrance next to the Emergency Room, and come to the 6th floor to the Outpatient Surgery Department.     Visitory Policy:  You are allowed 2 adult visitors to be with you in the hospital. All hospital visitors should be in good current health.  No small children.     What to Bring:  Please have your ID, insurance cards, and all home medication bottles with you at check in.  Bring your CPAP machine if one is used at home.     Fasting:  Nothing to eat or drink after midnight the night before your procedure. This includes no ice, gum, hard candies, and/or tobacco products.  Follow your doctor's instructions for taking any medications on the morning of your procedure.  If no instructions for taking medications were given, do not take any medications but bring your medications in their bottles to your procedure check in.     Follow your doctor's preoperative instructions regarding skin prep, bowel prep, bathing, or showering prior to your procedure.  If any special soaps were provided to you, please use according to your doctor's instructions. If no instructions were given from your doctor, take a good bath or shower with antibacterial soap the night " before and the morning of your procedure.  On the morning of procedure, wear loose, comfortable clothing.  No lotions, makeup, perfumes, colognes, deodorant, or jewelry to your procedure.  Removable items (glasses, contact lenses, dentures, retainers, hearing aids) need to be removed for your procedure.  Bring your storage containers for these items if you wear them.     Artificial nails, body jewelry, eyelash extensions, and/or hair extensions with metal clips are not allowed during your surgery.  If you currently wear any of these items, please arrange for them to be removed prior to your arrival to the hospital.     Outpatient or Same Day Surgeries:  Any patients receiving sedation/anesthesia are advised not to drive for 24 hours after their procedure.  We do not allow patients to drive themselves home after discharge.  If you are going home after your procedure, please have someone available to drive you home from the hospital.        You may call the Outpatient Surgery Department at [phone] with any questions or concerns.  We are looking forward to meeting you and taking great care of you for your procedure.  Thank you for choosing [facility] for your surgical needs.

## 2024-10-01 ENCOUNTER — HOSPITAL ENCOUNTER (OUTPATIENT)
Facility: HOSPITAL | Age: 84
Discharge: HOME | End: 2024-10-02
Attending: UROLOGY | Admitting: UROLOGY
Payer: MEDICARE

## 2024-10-01 ENCOUNTER — ANESTHESIA (OUTPATIENT)
Dept: SURGERY | Facility: HOSPITAL | Age: 84
End: 2024-10-01
Payer: MEDICARE

## 2024-10-01 DIAGNOSIS — Z01.818 PREOP TESTING: ICD-10-CM

## 2024-10-01 DIAGNOSIS — N13.8 ENLARGED PROSTATE WITH URINARY OBSTRUCTION: ICD-10-CM

## 2024-10-01 DIAGNOSIS — N40.1 ENLARGED PROSTATE WITH URINARY OBSTRUCTION: ICD-10-CM

## 2024-10-01 LAB
ALBUMIN SERPL-MCNC: 3.5 G/DL (ref 3.4–4.8)
ALBUMIN/GLOB SERPL: 1.4 RATIO (ref 1.1–2)
ALP SERPL-CCNC: 65 UNIT/L (ref 40–150)
ALT SERPL-CCNC: 9 UNIT/L (ref 0–55)
ANION GAP SERPL CALC-SCNC: 7 MEQ/L
AST SERPL-CCNC: 19 UNIT/L (ref 5–34)
BASOPHILS # BLD AUTO: 0.05 X10(3)/MCL
BASOPHILS NFR BLD AUTO: 1 %
BILIRUB SERPL-MCNC: 0.4 MG/DL
BUN SERPL-MCNC: 27.9 MG/DL (ref 8.4–25.7)
CALCIUM SERPL-MCNC: 9 MG/DL (ref 8.8–10)
CHLORIDE SERPL-SCNC: 106 MMOL/L (ref 98–107)
CO2 SERPL-SCNC: 25 MMOL/L (ref 23–31)
CREAT SERPL-MCNC: 1.45 MG/DL (ref 0.73–1.18)
CREAT/UREA NIT SERPL: 19
EOSINOPHIL # BLD AUTO: 0.33 X10(3)/MCL (ref 0–0.9)
EOSINOPHIL NFR BLD AUTO: 6.8 %
ERYTHROCYTE [DISTWIDTH] IN BLOOD BY AUTOMATED COUNT: 14.9 % (ref 11.5–17)
GFR SERPLBLD CREATININE-BSD FMLA CKD-EPI: 47 ML/MIN/1.73/M2
GLOBULIN SER-MCNC: 2.5 GM/DL (ref 2.4–3.5)
GLUCOSE SERPL-MCNC: 97 MG/DL (ref 82–115)
GROUP & RH: NORMAL
HCT VFR BLD AUTO: 34.3 % (ref 42–52)
HGB BLD-MCNC: 11.4 G/DL (ref 14–18)
IMM GRANULOCYTES # BLD AUTO: 0.01 X10(3)/MCL (ref 0–0.04)
IMM GRANULOCYTES NFR BLD AUTO: 0.2 %
INDIRECT COOMBS: NORMAL
LYMPHOCYTES # BLD AUTO: 1.08 X10(3)/MCL (ref 0.6–4.6)
LYMPHOCYTES NFR BLD AUTO: 22.2 %
MCH RBC QN AUTO: 31.2 PG (ref 27–31)
MCHC RBC AUTO-ENTMCNC: 33.2 G/DL (ref 33–36)
MCV RBC AUTO: 94 FL (ref 80–94)
MONOCYTES # BLD AUTO: 0.57 X10(3)/MCL (ref 0.1–1.3)
MONOCYTES NFR BLD AUTO: 11.7 %
NEUTROPHILS # BLD AUTO: 2.82 X10(3)/MCL (ref 2.1–9.2)
NEUTROPHILS NFR BLD AUTO: 58.1 %
NRBC BLD AUTO-RTO: 0 %
OHS QRS DURATION: 88 MS
OHS QTC CALCULATION: 431 MS
PLATELET # BLD AUTO: 167 X10(3)/MCL (ref 130–400)
PMV BLD AUTO: 9.6 FL (ref 7.4–10.4)
POTASSIUM SERPL-SCNC: 5.4 MMOL/L (ref 3.5–5.1)
PROT SERPL-MCNC: 6 GM/DL (ref 5.8–7.6)
RBC # BLD AUTO: 3.65 X10(6)/MCL (ref 4.7–6.1)
SODIUM SERPL-SCNC: 138 MMOL/L (ref 136–145)
SPECIMEN OUTDATE: NORMAL
WBC # BLD AUTO: 4.86 X10(3)/MCL (ref 4.5–11.5)

## 2024-10-01 PROCEDURE — 63600175 PHARM REV CODE 636 W HCPCS: Performed by: UROLOGY

## 2024-10-01 PROCEDURE — 36000706: Performed by: UROLOGY

## 2024-10-01 PROCEDURE — 63600175 PHARM REV CODE 636 W HCPCS

## 2024-10-01 PROCEDURE — 71000033 HC RECOVERY, INTIAL HOUR: Performed by: UROLOGY

## 2024-10-01 PROCEDURE — 71000016 HC POSTOP RECOV ADDL HR: Performed by: UROLOGY

## 2024-10-01 PROCEDURE — 37000008 HC ANESTHESIA 1ST 15 MINUTES: Performed by: UROLOGY

## 2024-10-01 PROCEDURE — 88305 TISSUE EXAM BY PATHOLOGIST: CPT | Performed by: UROLOGY

## 2024-10-01 PROCEDURE — G0378 HOSPITAL OBSERVATION PER HR: HCPCS

## 2024-10-01 PROCEDURE — 86900 BLOOD TYPING SEROLOGIC ABO: CPT | Performed by: UROLOGY

## 2024-10-01 PROCEDURE — 71000015 HC POSTOP RECOV 1ST HR: Performed by: UROLOGY

## 2024-10-01 PROCEDURE — 63600175 PHARM REV CODE 636 W HCPCS: Performed by: NURSE ANESTHETIST, CERTIFIED REGISTERED

## 2024-10-01 PROCEDURE — 36415 COLL VENOUS BLD VENIPUNCTURE: CPT | Performed by: UROLOGY

## 2024-10-01 PROCEDURE — 36000707: Performed by: UROLOGY

## 2024-10-01 PROCEDURE — 93005 ELECTROCARDIOGRAM TRACING: CPT

## 2024-10-01 PROCEDURE — 86850 RBC ANTIBODY SCREEN: CPT | Performed by: UROLOGY

## 2024-10-01 PROCEDURE — 27201423 OPTIME MED/SURG SUP & DEVICES STERILE SUPPLY: Performed by: UROLOGY

## 2024-10-01 PROCEDURE — 96365 THER/PROPH/DIAG IV INF INIT: CPT

## 2024-10-01 PROCEDURE — 37000009 HC ANESTHESIA EA ADD 15 MINS: Performed by: UROLOGY

## 2024-10-01 PROCEDURE — 71000039 HC RECOVERY, EACH ADD'L HOUR: Performed by: UROLOGY

## 2024-10-01 PROCEDURE — 25000003 PHARM REV CODE 250: Performed by: UROLOGY

## 2024-10-01 PROCEDURE — 93010 ELECTROCARDIOGRAM REPORT: CPT | Mod: ,,, | Performed by: INTERNAL MEDICINE

## 2024-10-01 PROCEDURE — 25000003 PHARM REV CODE 250: Performed by: NURSE ANESTHETIST, CERTIFIED REGISTERED

## 2024-10-01 PROCEDURE — 85025 COMPLETE CBC W/AUTO DIFF WBC: CPT | Performed by: UROLOGY

## 2024-10-01 PROCEDURE — 96375 TX/PRO/DX INJ NEW DRUG ADDON: CPT

## 2024-10-01 PROCEDURE — 80053 COMPREHEN METABOLIC PANEL: CPT | Performed by: UROLOGY

## 2024-10-01 PROCEDURE — 86901 BLOOD TYPING SEROLOGIC RH(D): CPT | Performed by: UROLOGY

## 2024-10-01 RX ORDER — GLUCAGON 1 MG
1 KIT INJECTION
Status: DISCONTINUED | OUTPATIENT
Start: 2024-10-01 | End: 2024-10-01 | Stop reason: HOSPADM

## 2024-10-01 RX ORDER — ONDANSETRON HYDROCHLORIDE 2 MG/ML
4 INJECTION, SOLUTION INTRAVENOUS DAILY PRN
Status: DISCONTINUED | OUTPATIENT
Start: 2024-10-01 | End: 2024-10-01 | Stop reason: HOSPADM

## 2024-10-01 RX ORDER — HYDROMORPHONE HYDROCHLORIDE 2 MG/ML
0.2 INJECTION, SOLUTION INTRAMUSCULAR; INTRAVENOUS; SUBCUTANEOUS EVERY 5 MIN PRN
Status: DISCONTINUED | OUTPATIENT
Start: 2024-10-01 | End: 2024-10-01 | Stop reason: HOSPADM

## 2024-10-01 RX ORDER — ROCURONIUM BROMIDE 10 MG/ML
INJECTION, SOLUTION INTRAVENOUS
Status: DISCONTINUED | OUTPATIENT
Start: 2024-10-01 | End: 2024-10-01

## 2024-10-01 RX ORDER — LIDOCAINE HYDROCHLORIDE 10 MG/ML
1 INJECTION, SOLUTION EPIDURAL; INFILTRATION; INTRACAUDAL; PERINEURAL ONCE
OUTPATIENT
Start: 2024-10-01 | End: 2024-10-01

## 2024-10-01 RX ORDER — PROPOFOL 10 MG/ML
VIAL (ML) INTRAVENOUS
Status: DISCONTINUED | OUTPATIENT
Start: 2024-10-01 | End: 2024-10-01

## 2024-10-01 RX ORDER — GABAPENTIN 300 MG/1
300 CAPSULE ORAL NIGHTLY
Status: DISCONTINUED | OUTPATIENT
Start: 2024-10-01 | End: 2024-10-02 | Stop reason: HOSPADM

## 2024-10-01 RX ORDER — METOPROLOL SUCCINATE 25 MG/1
25 TABLET, EXTENDED RELEASE ORAL 2 TIMES DAILY
Status: DISCONTINUED | OUTPATIENT
Start: 2024-10-01 | End: 2024-10-02 | Stop reason: HOSPADM

## 2024-10-01 RX ORDER — HYDROMORPHONE HYDROCHLORIDE 2 MG/ML
1 INJECTION, SOLUTION INTRAMUSCULAR; INTRAVENOUS; SUBCUTANEOUS EVERY 4 HOURS PRN
Status: DISCONTINUED | OUTPATIENT
Start: 2024-10-01 | End: 2024-10-02 | Stop reason: HOSPADM

## 2024-10-01 RX ORDER — ACETAMINOPHEN 325 MG/1
650 TABLET ORAL EVERY 4 HOURS PRN
Status: DISCONTINUED | OUTPATIENT
Start: 2024-10-01 | End: 2024-10-02 | Stop reason: HOSPADM

## 2024-10-01 RX ORDER — HYDROMORPHONE HYDROCHLORIDE 2 MG/ML
0.5 INJECTION, SOLUTION INTRAMUSCULAR; INTRAVENOUS; SUBCUTANEOUS EVERY 5 MIN PRN
Status: DISCONTINUED | OUTPATIENT
Start: 2024-10-01 | End: 2024-10-01 | Stop reason: HOSPADM

## 2024-10-01 RX ORDER — LABETALOL HYDROCHLORIDE 5 MG/ML
10 INJECTION, SOLUTION INTRAVENOUS ONCE
Status: COMPLETED | OUTPATIENT
Start: 2024-10-01 | End: 2024-10-01

## 2024-10-01 RX ORDER — CEFAZOLIN SODIUM 2 G/50ML
2 SOLUTION INTRAVENOUS
Status: COMPLETED | OUTPATIENT
Start: 2024-10-01 | End: 2024-10-02

## 2024-10-01 RX ORDER — FENTANYL CITRATE 50 UG/ML
INJECTION, SOLUTION INTRAMUSCULAR; INTRAVENOUS
Status: DISCONTINUED | OUTPATIENT
Start: 2024-10-01 | End: 2024-10-01

## 2024-10-01 RX ORDER — DEXAMETHASONE SODIUM PHOSPHATE 4 MG/ML
INJECTION, SOLUTION INTRA-ARTICULAR; INTRALESIONAL; INTRAMUSCULAR; INTRAVENOUS; SOFT TISSUE
Status: DISCONTINUED | OUTPATIENT
Start: 2024-10-01 | End: 2024-10-01

## 2024-10-01 RX ORDER — PROCHLORPERAZINE EDISYLATE 5 MG/ML
5 INJECTION INTRAMUSCULAR; INTRAVENOUS EVERY 30 MIN PRN
Status: CANCELLED | OUTPATIENT
Start: 2024-10-01

## 2024-10-01 RX ORDER — HYDROCODONE BITARTRATE AND ACETAMINOPHEN 5; 325 MG/1; MG/1
1 TABLET ORAL EVERY 4 HOURS PRN
Status: DISCONTINUED | OUTPATIENT
Start: 2024-10-01 | End: 2024-10-02 | Stop reason: HOSPADM

## 2024-10-01 RX ORDER — ONDANSETRON HYDROCHLORIDE 2 MG/ML
INJECTION, SOLUTION INTRAVENOUS
Status: DISCONTINUED | OUTPATIENT
Start: 2024-10-01 | End: 2024-10-01

## 2024-10-01 RX ORDER — LIDOCAINE HYDROCHLORIDE 20 MG/ML
INJECTION, SOLUTION EPIDURAL; INFILTRATION; INTRACAUDAL; PERINEURAL
Status: DISCONTINUED | OUTPATIENT
Start: 2024-10-01 | End: 2024-10-01

## 2024-10-01 RX ORDER — CEFAZOLIN SODIUM 2 G/50ML
2 SOLUTION INTRAVENOUS
Status: DISCONTINUED | OUTPATIENT
Start: 2024-10-01 | End: 2024-10-01 | Stop reason: HOSPADM

## 2024-10-01 RX ORDER — ONDANSETRON 4 MG/1
8 TABLET, ORALLY DISINTEGRATING ORAL EVERY 8 HOURS PRN
Status: DISCONTINUED | OUTPATIENT
Start: 2024-10-01 | End: 2024-10-02 | Stop reason: HOSPADM

## 2024-10-01 RX ORDER — EPHEDRINE SULFATE 50 MG/ML
INJECTION, SOLUTION INTRAVENOUS
Status: DISCONTINUED | OUTPATIENT
Start: 2024-10-01 | End: 2024-10-01

## 2024-10-01 RX ORDER — TALC
6 POWDER (GRAM) TOPICAL NIGHTLY PRN
Status: DISCONTINUED | OUTPATIENT
Start: 2024-10-01 | End: 2024-10-02 | Stop reason: HOSPADM

## 2024-10-01 RX ORDER — TRAMADOL HYDROCHLORIDE 50 MG/1
50 TABLET ORAL EVERY 6 HOURS PRN
Status: DISCONTINUED | OUTPATIENT
Start: 2024-10-01 | End: 2024-10-01

## 2024-10-01 RX ORDER — LABETALOL HYDROCHLORIDE 5 MG/ML
INJECTION, SOLUTION INTRAVENOUS
Status: COMPLETED
Start: 2024-10-01 | End: 2024-10-01

## 2024-10-01 RX ORDER — OXYBUTYNIN CHLORIDE 5 MG/1
5 TABLET ORAL 3 TIMES DAILY
Status: DISCONTINUED | OUTPATIENT
Start: 2024-10-01 | End: 2024-10-02 | Stop reason: HOSPADM

## 2024-10-01 RX ORDER — SODIUM CHLORIDE 9 MG/ML
INJECTION, SOLUTION INTRAVENOUS CONTINUOUS
Status: DISCONTINUED | OUTPATIENT
Start: 2024-10-01 | End: 2024-10-02 | Stop reason: HOSPADM

## 2024-10-01 RX ADMIN — LABETALOL HYDROCHLORIDE 10 MG: 5 INJECTION, SOLUTION INTRAVENOUS at 02:10

## 2024-10-01 RX ADMIN — EPHEDRINE SULFATE 15 MG: 50 INJECTION INTRAVENOUS at 12:10

## 2024-10-01 RX ADMIN — OXYBUTYNIN CHLORIDE 5 MG: 5 TABLET ORAL at 09:10

## 2024-10-01 RX ADMIN — EPHEDRINE SULFATE 25 MG: 50 INJECTION INTRAVENOUS at 01:10

## 2024-10-01 RX ADMIN — PROPOFOL 30 MG: 10 INJECTION, EMULSION INTRAVENOUS at 01:10

## 2024-10-01 RX ADMIN — PROPOFOL 150 MG: 10 INJECTION, EMULSION INTRAVENOUS at 12:10

## 2024-10-01 RX ADMIN — METOPROLOL SUCCINATE 25 MG: 25 TABLET, EXTENDED RELEASE ORAL at 09:10

## 2024-10-01 RX ADMIN — CEFAZOLIN SODIUM 2 G: 2 SOLUTION INTRAVENOUS at 09:10

## 2024-10-01 RX ADMIN — CEFAZOLIN SODIUM 2 G: 2 SOLUTION INTRAVENOUS at 12:10

## 2024-10-01 RX ADMIN — SODIUM CHLORIDE, SODIUM GLUCONATE, SODIUM ACETATE, POTASSIUM CHLORIDE AND MAGNESIUM CHLORIDE: 526; 502; 368; 37; 30 INJECTION, SOLUTION INTRAVENOUS at 12:10

## 2024-10-01 RX ADMIN — ONDANSETRON 4 MG: 2 INJECTION INTRAMUSCULAR; INTRAVENOUS at 01:10

## 2024-10-01 RX ADMIN — GABAPENTIN 300 MG: 300 CAPSULE ORAL at 09:10

## 2024-10-01 RX ADMIN — SUGAMMADEX 200 MG: 100 INJECTION, SOLUTION INTRAVENOUS at 01:10

## 2024-10-01 RX ADMIN — DEXAMETHASONE SODIUM PHOSPHATE 4 MG: 4 INJECTION, SOLUTION INTRA-ARTICULAR; INTRALESIONAL; INTRAMUSCULAR; INTRAVENOUS; SOFT TISSUE at 12:10

## 2024-10-01 RX ADMIN — LIDOCAINE HYDROCHLORIDE 80 MG: 20 INJECTION, SOLUTION INTRAVENOUS at 12:10

## 2024-10-01 RX ADMIN — FENTANYL CITRATE 50 MCG: 50 INJECTION, SOLUTION INTRAMUSCULAR; INTRAVENOUS at 01:10

## 2024-10-01 RX ADMIN — EPHEDRINE SULFATE 10 MG: 50 INJECTION INTRAVENOUS at 01:10

## 2024-10-01 RX ADMIN — ROCURONIUM BROMIDE 50 MG: 10 SOLUTION INTRAVENOUS at 12:10

## 2024-10-01 RX ADMIN — FENTANYL CITRATE 50 MCG: 50 INJECTION, SOLUTION INTRAMUSCULAR; INTRAVENOUS at 12:10

## 2024-10-01 NOTE — TRANSFER OF CARE
"Anesthesia Transfer of Care Note    Patient: Checo Webb    Procedure(s) Performed: Procedure(s) (LRB):  TURP (TRANSURETHRAL RESECTION OF PROSTATE) (N/A)    Patient location: PACU    Anesthesia Type: general    Transport from OR: Transported from OR on room air with adequate spontaneous ventilation    Post pain: adequate analgesia    Post assessment: no apparent anesthetic complications    Post vital signs: stable    Level of consciousness: responds to stimulation    Nausea/Vomiting: no nausea/vomiting    Complications: none    Transfer of care protocol was followed    Last vitals: Visit Vitals  BP (!) 165/111   Pulse 85   Temp 37 °C (98.6 °F)   Resp 17   Ht 5' 11" (1.803 m)   Wt 81.4 kg (179 lb 7.3 oz)   SpO2 98%   BMI 25.03 kg/m²     "

## 2024-10-01 NOTE — OP NOTE
PREOPERATIVE DIAGNOSIS(ES):  BPH with bladder outlet obstruction.    POSTOPERATIVE DIAGNOSIS(ES):  BPH with bladder outlet obstruction.    PROCEDURE:  Transurethral resection of prostate (TURP)    SURGEON:  Naun Mcneil MD    ANESTHESIA:  General.    FLUIDS:  1000mL.    ESTIMATED BLOOD LOSS:  15 mL.    SPECIMENS:  Prostate chips sent to pathology.    FINDINGS:  Trilobar hypertrophy of prostate.    DRAINS:  An 22-Mozambican 3 way Talavera catheter in the bladder, with 20 mL sterile water in balloon, to saline CBI by gravity, and gravity drainage.    COMPLICATIONS:  None.    SUMMARY:  After informed consent was obtained, the patient brought to the operating room and placed in  supine position. After adequate general anesthetic, they were positioned in dorsal lithotomy.  The genitalia was prepped and draped sterile manner. A time-out was performed and the  patient identified using 2 identifiers and procedure site was verified. A 21-Mozambican rigid  cystoscope was advanced into the bladder. The bladder was drained and examined thoroughly  with 30- and 70- degree lenses. The urethra was unremarkable. The above-mentioned findings  were seen. The ureteral orifices were in the normal position with clear efflux bilaterally. The  cystoscope was removed. We then placed a 26-Mozambican  continuous flow resectoscope  sheath and visual obturator into the bladder without difficulty. The Bipolar  resectoscope was then used to perform transurethral resection of prostate in standard fashion.  We resected the median lobe first, and then took down the left lateral lobe. We resected from 6 o'clock   anterioly towards 12 o'clock from bladder neck to verumontanum. The right lateral lobe  was then taken down in a similar manner. Care was taken to avoid resecting proximal to the  bladder neck or distal to the verumontanum. The prostate was completely resected to the  surgical capsule and all chips were evacuated with Urovac evacuator. These were sent  to  pathology for permanent section. Hemostasis was achieved with a pinpoint electrocautery.  There was minimal bleeding. There were no chips left in the bladder, and the ureteral orifices  and verumontanum were intact. The scope was removed. The patient tolerated the procedure  well. The patient was extubated and transported to the recovery room stable condition.    PLAN:  CBI in PACU,   Transfer to floor    10/01/2024  2:40 PM

## 2024-10-01 NOTE — ANESTHESIA PREPROCEDURE EVALUATION
10/01/2024  Checo Webb is a 84 y.o., male.    Na 136, K 4.5, Cr 1.33  ECG sinus riaz with sinus arrhythmia            Pre-op Assessment    I have reviewed the Patient Summary Reports.     I have reviewed the Nursing Notes. I have reviewed the NPO Status.   I have reviewed the Medications.     Review of Systems  Anesthesia Hx:               Denies Personal Hx of Anesthesia complications.                    Cardiovascular:     Hypertension          PVD hyperlipidemia                             Pulmonary:  Pulmonary Normal                       Neurological:  TIA,         parkinsons                            Endocrine:  Endocrine Normal            Psych:  Psychiatric Normal                    Physical Exam  General: Well nourished, Cooperative and Alert    Airway:  Mallampati: II   Mouth Opening: Normal  TM Distance: Normal  Tongue: Normal    Dental:  Intact    Chest/Lungs:  Normal Respiratory Rate    Heart:  Rate: Bradycardia        Anesthesia Plan  Type of Anesthesia, risks & benefits discussed:    Anesthesia Type: Gen ETT  Intra-op Monitoring Plan: Standard ASA Monitors  Post Op Pain Control Plan: multimodal analgesia  Induction:  IV  Informed Consent: Informed consent signed with the Patient and all parties understand the risks and agree with anesthesia plan.  All questions answered.   ASA Score: 3  Day of Surgery Review of History & Physical: H&P Update referred to the surgeon/provider.    Ready For Surgery From Anesthesia Perspective.     .

## 2024-10-01 NOTE — ANESTHESIA PROCEDURE NOTES
Intubation    Date/Time: 10/1/2024 12:38 PM    Performed by: Tang Tenorio CRNA  Authorized by: Janice Chavez MD    Intubation:     Induction:  Intravenous    Intubated:  Postinduction    Mask Ventilation:  Easy mask    Attempts:  1    Attempted By:  CRNA    Method of Intubation:  Direct    Blade:  Camarena 2    Laryngeal View Grade: Grade IIA - cords partially seen      Difficult Airway Encountered?: No      Complications:  None    Airway Device:  Oral endotracheal tube    Airway Device Size:  7.5    Style/Cuff Inflation:  Cuffed (inflated to minimal occlusive pressure)    Inflation Amount (mL):  6    Tube secured:  21    Secured at:  The lips    Placement Verified By:  Capnometry    Complicating Factors:  None    Findings Post-Intubation:  BS equal bilateral

## 2024-10-02 VITALS
BODY MASS INDEX: 25.12 KG/M2 | DIASTOLIC BLOOD PRESSURE: 81 MMHG | TEMPERATURE: 99 F | HEART RATE: 60 BPM | RESPIRATION RATE: 18 BRPM | SYSTOLIC BLOOD PRESSURE: 157 MMHG | HEIGHT: 71 IN | OXYGEN SATURATION: 98 % | WEIGHT: 179.44 LBS

## 2024-10-02 LAB
ANION GAP SERPL CALC-SCNC: 8 MEQ/L
BUN SERPL-MCNC: 26.3 MG/DL (ref 8.4–25.7)
CALCIUM SERPL-MCNC: 8.8 MG/DL (ref 8.8–10)
CHLORIDE SERPL-SCNC: 104 MMOL/L (ref 98–107)
CO2 SERPL-SCNC: 24 MMOL/L (ref 23–31)
CREAT SERPL-MCNC: 1.36 MG/DL (ref 0.73–1.18)
CREAT/UREA NIT SERPL: 19
GFR SERPLBLD CREATININE-BSD FMLA CKD-EPI: 51 ML/MIN/1.73/M2
GLUCOSE SERPL-MCNC: 107 MG/DL (ref 82–115)
HCT VFR BLD AUTO: 32.1 % (ref 42–52)
HGB BLD-MCNC: 10.6 G/DL (ref 14–18)
POTASSIUM SERPL-SCNC: 5.1 MMOL/L (ref 3.5–5.1)
PSYCHE PATHOLOGY RESULT: NORMAL
SARS-COV-2 RDRP RESP QL NAA+PROBE: NEGATIVE
SODIUM SERPL-SCNC: 136 MMOL/L (ref 136–145)

## 2024-10-02 PROCEDURE — 99900035 HC TECH TIME PER 15 MIN (STAT)

## 2024-10-02 PROCEDURE — 96366 THER/PROPH/DIAG IV INF ADDON: CPT

## 2024-10-02 PROCEDURE — 25000003 PHARM REV CODE 250: Performed by: UROLOGY

## 2024-10-02 PROCEDURE — 85014 HEMATOCRIT: CPT | Performed by: UROLOGY

## 2024-10-02 PROCEDURE — 94799 UNLISTED PULMONARY SVC/PX: CPT | Mod: XB

## 2024-10-02 PROCEDURE — 36415 COLL VENOUS BLD VENIPUNCTURE: CPT | Performed by: UROLOGY

## 2024-10-02 PROCEDURE — U0002 COVID-19 LAB TEST NON-CDC: HCPCS | Performed by: UROLOGY

## 2024-10-02 PROCEDURE — 85018 HEMOGLOBIN: CPT | Performed by: UROLOGY

## 2024-10-02 PROCEDURE — 63600175 PHARM REV CODE 636 W HCPCS: Performed by: UROLOGY

## 2024-10-02 PROCEDURE — 99900031 HC PATIENT EDUCATION (STAT)

## 2024-10-02 PROCEDURE — G0378 HOSPITAL OBSERVATION PER HR: HCPCS

## 2024-10-02 PROCEDURE — 80048 BASIC METABOLIC PNL TOTAL CA: CPT | Performed by: UROLOGY

## 2024-10-02 RX ADMIN — METOPROLOL SUCCINATE 25 MG: 25 TABLET, EXTENDED RELEASE ORAL at 09:10

## 2024-10-02 RX ADMIN — CEFAZOLIN SODIUM 2 G: 2 SOLUTION INTRAVENOUS at 05:10

## 2024-10-02 RX ADMIN — OXYBUTYNIN CHLORIDE 5 MG: 5 TABLET ORAL at 09:10

## 2024-10-02 NOTE — ANESTHESIA POSTPROCEDURE EVALUATION
Anesthesia Post Evaluation    Patient: Checo Webb    Procedure(s) Performed: Procedure(s) (LRB):  TURP (TRANSURETHRAL RESECTION OF PROSTATE) (N/A)    Final Anesthesia Type: general      Patient location during evaluation: PACU  Patient participation: Yes- Able to Participate  Level of consciousness: awake and alert  Post-procedure vital signs: reviewed and stable  Pain management: adequate  Airway patency: patent  NIR mitigation strategies: Multimodal analgesia  PONV status at discharge: No PONV  Anesthetic complications: no      Cardiovascular status: blood pressure returned to baseline and hemodynamically stable  Respiratory status: room air  Hydration status: euvolemic  Follow-up not needed.              Vitals Value Taken Time   /75 10/01/24 1734   Temp 36.8 °C (98.3 °F) 10/01/24 1734   Pulse 79 10/01/24 1734   Resp 20 10/01/24 1734   SpO2 95 % 10/01/24 1734         Event Time   Out of Recovery 17:35:00         Pain/Gualberto Score: Gualberto Score: 10 (10/1/2024  5:35 PM)

## 2024-10-02 NOTE — DISCHARGE SUMMARY
[facility] 8th Floor Med Surg  Urology  Discharge Summary      Patient Name: Checo Webb  MRN: [id number]  Admission Date: 10/1/2024  Hospital Length of Stay: 0 days  Discharge Date and Time:  10/02/2024 11:28 AM  Attending Physician: Naun Mcneil MD   Discharging Provider: AMARJIT Ruiz  Primary Care Physician: David Cifuentes III, MD      Procedure(s) (LRB):  TURP (TRANSURETHRAL RESECTION OF PROSTATE) (N/A)     Indwelling Lines/Drains at time of discharge:   Lines/Drains/Airways       Drain  Duration                  Urethral Catheter 10/01/24 Triple-lumen 24 Fr. 1 day                    Hospital Course (synopsis of major diagnoses, care, treatment, and services provided during the course of the hospital stay): Mr. Webb is an 83 yo male admitted for observation following an elective TURP for BPH with outlet obstruction. Tolerated procedure well. Remains hemodynamically stable. He is tolerating PO, pain controlled and ambulating. Discharge instructions discussed. He will follow up in 1 week for a voiding trial.      Consults: none    Significant Diagnostic Studies: none    Pending Diagnostic Studies:       Procedure Component Value Units Date/Time    Specimen to Pathology [[id number]] Collected: 10/01/24 1326    Order Status: Sent Lab Status: In process Updated: 10/01/24 1603    Specimen: Tissue from Prostate             Final Active Diagnoses:      Problems Resolved During this Admission:    Diagnosis Date Noted Date Resolved POA    PRINCIPAL PROBLEM:  Enlarged prostate with urinary obstruction [N40.1, N13.8] 10/01/2024 10/01/2024 Yes       Discharged Condition: good    Disposition: Home or Self Care    Follow Up:   Follow-up Information       Naun Mcneil MD Follow up in 1 week(s).    Specialty: Urology  Contact information:  [facility] [address]  [facility] [id number] [phone]                           Patient Instructions:      Lifting restrictions   Order  Comments: No lifting >10 pounds. No straining     Notify your health care provider if you experience any of the following:  temperature >100.4     Notify your health care provider if you experience any of the following:  persistent nausea and vomiting or diarrhea     Notify your health care provider if you experience any of the following:  severe uncontrolled pain     Medications:  Reconciled Home Medications:      Medication List        CONTINUE taking these medications      finasteride 5 mg tablet  Commonly known as: PROSCAR  Take 5 mg by mouth once daily.     Fish OiL 300-1,000 mg Cap  Generic drug: omega 3-dha-epa-fish oil  Take 2 capsules by mouth once daily.     gabapentin 300 MG capsule  Commonly known as: NEURONTIN  Take 300 mg by mouth every evening.     metoprolol succinate 25 MG 24 hr tablet  Commonly known as: TOPROL-XL  Take 25 mg by mouth 2 (two) times daily.     rosuvastatin 5 MG tablet  Commonly known as: CRESTOR  Take 5 mg by mouth every evening.     tamsulosin 0.4 mg Cap  Commonly known as: FLOMAX  Take 0.4 mg by mouth once daily.     traMADoL 50 mg tablet  Commonly known as: ULTRAM  Take 50 mg by mouth every 6 (six) hours as needed for Pain.     valsartan 160 MG tablet  Commonly known as: DIOVAN  Take 160 mg by mouth once daily.                AMARJIT Ruiz  Urology  [facility] - 8th Floor Med Surg

## 2024-10-02 NOTE — PLAN OF CARE
ERNA Mcdonald completed with daughter, Radha, over the phone. Email sent to [employer] for delivery.

## 2024-10-02 NOTE — PLAN OF CARE
10/02/24 1014   Discharge Assessment   Assessment Type Discharge Planning Assessment   Source of Information facility verbal report   People in Home facility resident   Do you expect to return to your current living situation? Yes   Home Accessibility wheelchair accessible   Home Layout Able to live on 1st floor   Discharge Plan A Return to nursing home   Discharge Plan B Return to Nursing Home   Transition of Care Barriers None   OTHER   Name(s) of People in Home Facility resident     Patient is a resident at the [facility]. Polly with the [facility] called to confirm patient will return to facility at a resident.

## 2024-10-02 NOTE — PLAN OF CARE
10/02/24 1321   Final Note   Assessment Type Final Discharge Note   Anticipated Discharge Disposition [facility]   Post-Acute Status   Post-Acute Authorization Placement   Post-Acute Placement Status Set-up Complete/Auth obtained   Discharge Delays None known at this time     Patient will return to nursing home today. Report has been called to [facility]. Facility will arrange transportation.

## 2024-10-02 NOTE — NURSING
DC note: Pt educated on care after surgery.  Report called to JOANA Zarate @ [facility].  NAD noted.  All questions answered.  F/u appt given.

## 2024-10-02 NOTE — PLAN OF CARE
SSC reiterated UMANZOR form and patient was able to sign. A copy was given to patient and signed original was placed in chart.

## 2024-10-02 NOTE — PROGRESS NOTES
Inpatient Nutrition Evaluation    Admit Date: 10/1/2024   Total duration of encounter: 1 day   Patient Age: 84 y.o.    Nutrition Recommendation/Prescription     -Continue Low Residue Diet as tolerated.   -Sg BID for wound healing.   -Monitor wt, labs, and intake.     Nutrition Assessment     Chart Review    Reason Seen: malnutrition screening tool (MST)    Malnutrition Screening Tool Results   Have you recently lost weight without trying?: Yes: Unsure how much  Have you been eating poorly because of a decreased appetite?: No   MST Score: 2   Diagnosis:  BPH with bladder outlet obstruction  s/p TRANSURETHRAL RESECTION OF PROSTATE     Relevant Medical History:   Allergic rhinitis   Amnesia   BPH with urinary obstruction   Chest pain syndrome   Difficulty walking   Dizziness and giddiness   Drusen (degenerative) of macula, bilateral   Elevated PSA   Generalized muscle weakness   HLD (hyperlipidemia)   Hypertension   Indwelling urinary catheter present   Lack of coordination   Low back pain   Nonrheumatic mitral (valve) insufficiency   Occlusion and stenosis of bilateral carotid arteries   Other symptoms and signs involving the musculoskeletal system   Pain   Parkinson's disease   Parkinsonism   Peripheral vascular disease   Syncope and collapse   Transient alteration of awareness   Transient retinal artery occlusion, unspecified eye   Urinary retention   Urinary tract infection, site not specified   Uses walker   Uses wheelchair       Scheduled Medications:  gabapentin, 300 mg, QHS  metoprolol succinate, 25 mg, BID  oxybutynin, 5 mg, TID    Continuous Infusions:  0.9% NaCl    PRN Medications:   Current Facility-Administered Medications:     acetaminophen, 650 mg, Oral, Q4H PRN    HYDROcodone-acetaminophen, 1 tablet, Oral, Q4H PRN    HYDROmorphone, 1 mg, Intravenous, Q4H PRN    melatonin, 6 mg, Oral, Nightly PRN    ondansetron, 8 mg, Oral, Q8H PRN    Recent Labs   Lab 10/01/24  1016 10/02/24  0524    136   K  "5.4* 5.1   CALCIUM 9.0 8.8    104   CO2 25 24   BUN 27.9* 26.3*   CREATININE 1.45* 1.36*   EGFRNORACEVR 47 51   GLUCOSE 97 107   BILITOT 0.4  --    ALKPHOS 65  --    ALT 9  --    AST 19  --    ALBUMIN 3.5  --    WBC 4.86  --    HGB 11.4* 10.6*   HCT 34.3* 32.1*     Nutrition Orders:  Diet Low Fiber/Residue      Appetite/Oral Intake: good/% of meals  Factors Affecting Nutritional Intake: none identified  Food/Oriental orthodox/Cultural Preferences: none reported  Food Allergies: no known food allergies  Last Bowel Movement: 09/30/24  Wound(s):     Wound 10/01/24 1818 Pressure Injury Coccyx-Tissue loss description: Not applicable     Comments    10/2/24: Pt reports good appetite/intake and usual good appetite prior to admit; denies n/v; reports a usual wt of ~190 lbs last December but weight has been mostly stable over the last 6 months.     Anthropometrics    Height: 5' 11" (180.3 cm), Height Method: Stated  Last Weight: 81.4 kg (179 lb 7.3 oz) (10/01/24 1820), Weight Method: Bed Scale  BMI (Calculated): 25  BMI Classification: overweight (BMI 25-29.9)        Ideal Body Weight (IBW), Male: 172 lb     % Ideal Body Weight, Male (lb): 104.34 %                          Usual Weight Provided By: patient    Wt Readings from Last 5 Encounters:   10/01/24 81.4 kg (179 lb 7.3 oz)   09/04/24 79.5 kg (175 lb 4.8 oz)   05/17/24 82.6 kg (182 lb)   04/27/24 82.6 kg (182 lb 3.2 oz)   10/30/18 91.2 kg (201 lb 1 oz)     Weight Change(s) Since Admission:   Wt Readings from Last 1 Encounters:   10/01/24 1820 81.4 kg (179 lb 7.3 oz)   10/01/24 1056 81.4 kg (179 lb 7.3 oz)   09/20/24 1412 79.4 kg (175 lb)   Admit Weight: 79.4 kg (175 lb) (09/20/24 1412), Weight Method: Stated    Patient Education     Not applicable.    Nutrition Goals & Monitoring     Dietitian will monitor: energy intake and weight    Nutrition Risk/Follow-Up: low (follow-up in 5-7 days)  Patients assigned 'low nutrition risk' status do not qualify for a full " nutritional assessment but will be monitored and re-evaluated in a 5-7 day time period. Please consult if re-evaluation needed sooner.

## 2024-10-02 NOTE — PROGRESS NOTES
.UROLOGY  PROGRESS  NOTE    Checo Webb 1940  [id number]  10/2/2024    POD 1    Sitting up on side of the bed  Denies pain  Tolerating PO   Ambulating halls  Labs reviewed   Vital signs stable, afebrile       Exam:    NAD  Resp unlabored  Abd soft, NTND   faint pink urine in  bag with min CBI  Extremity no C/C/E      Recent Results (from the past 24 hours)   EKG 12-lead    Collection Time: 10/01/24 10:13 AM   Result Value Ref Range    QRS Duration 88 ms    [facility] QTC Calculation 431 ms   Type & Screen    Collection Time: 10/01/24 10:16 AM   Result Value Ref Range    Group & Rh B POS     Indirect Mehul GEL NEG     Specimen Outdate 10/04/2024 23:59    CBC with Differential    Collection Time: 10/01/24 10:16 AM   Result Value Ref Range    WBC 4.86 4.50 - 11.50 x10(3)/mcL    RBC 3.65 (L) 4.70 - 6.10 x10(6)/mcL    Hgb 11.4 (L) 14.0 - 18.0 g/dL    Hct 34.3 (L) 42.0 - 52.0 %    MCV 94.0 80.0 - 94.0 fL    MCH 31.2 (H) 27.0 - 31.0 pg    MCHC 33.2 33.0 - 36.0 g/dL    RDW 14.9 11.5 - 17.0 %    Platelet 167 130 - 400 x10(3)/mcL    MPV 9.6 7.4 - 10.4 fL    Neut % 58.1 %    Lymph % 22.2 %    Mono % 11.7 %    Eos % 6.8 %    Basophil % 1.0 %    Lymph # 1.08 0.6 - 4.6 x10(3)/mcL    Neut # 2.82 2.1 - 9.2 x10(3)/mcL    Mono # 0.57 0.1 - 1.3 x10(3)/mcL    Eos # 0.33 0 - 0.9 x10(3)/mcL    Baso # 0.05 <=0.2 x10(3)/mcL    IG# 0.01 0 - 0.04 x10(3)/mcL    IG% 0.2 %    NRBC% 0.0 %   Comprehensive Metabolic Panel    Collection Time: 10/01/24 10:16 AM   Result Value Ref Range    Sodium 138 136 - 145 mmol/L    Potassium 5.4 (H) 3.5 - 5.1 mmol/L    Chloride 106 98 - 107 mmol/L    CO2 25 23 - 31 mmol/L    Glucose 97 82 - 115 mg/dL    Blood Urea Nitrogen 27.9 (H) 8.4 - 25.7 mg/dL    Creatinine 1.45 (H) 0.73 - 1.18 mg/dL    Calcium 9.0 8.8 - 10.0 mg/dL    Protein Total 6.0 5.8 - 7.6 gm/dL    Albumin 3.5 3.4 - 4.8 g/dL    Globulin 2.5 2.4 - 3.5 gm/dL    Albumin/Globulin Ratio 1.4 1.1 - 2.0 ratio    Bilirubin Total 0.4 <=1.5 mg/dL    ALP 65 40 -  150 unit/L    ALT 9 0 - 55 unit/L    AST 19 5 - 34 unit/L    eGFR 47 mL/min/1.73/m2    Anion Gap 7.0 mEq/L    BUN/Creatinine Ratio 19    Basic metabolic panel    Collection Time: 10/02/24  5:24 AM   Result Value Ref Range    Sodium 136 136 - 145 mmol/L    Potassium 5.1 3.5 - 5.1 mmol/L    Chloride 104 98 - 107 mmol/L    CO2 24 23 - 31 mmol/L    Glucose 107 82 - 115 mg/dL    Blood Urea Nitrogen 26.3 (H) 8.4 - 25.7 mg/dL    Creatinine 1.36 (H) 0.73 - 1.18 mg/dL    BUN/Creatinine Ratio 19     Calcium 8.8 8.8 - 10.0 mg/dL    Anion Gap 8.0 mEq/L    eGFR 51 mL/min/1.73/m2   Hemoglobin and Hematocrit    Collection Time: 10/02/24  5:24 AM   Result Value Ref Range    Hgb 10.6 (L) 14.0 - 18.0 g/dL    Hct 32.1 (L) 42.0 - 52.0 %         Assessment:  BPH with bladder outlet obstruction  -status post TURP      Plan:  If urine remains relatively clear today without CBI and moving around, will plan for DC home with Talavera catheter and follow-up in 1 week for a voiding trial in the office. If hematuria is an issue, we will keep him overnight for further CBI.     Reena Kirk, NELLIEP

## 2024-10-02 NOTE — PROGRESS NOTES
"[facility] - 8th Floor Med Surg  Wound Care    Patient Name:  Checo Webb   MRN:  [id number]  Date: 10/2/2024  Diagnosis: Enlarged prostate with urinary obstruction    History:     Past Medical History:   Diagnosis Date    Allergic rhinitis     Amnesia     BPH with urinary obstruction     Chest pain syndrome     Difficulty walking     Dizziness and giddiness     Drusen (degenerative) of macula, bilateral     Elevated PSA     Generalized muscle weakness     HLD (hyperlipidemia)     Hypertension     Indwelling urinary catheter present     "caude"    Lack of coordination     Low back pain     Nonrheumatic mitral (valve) insufficiency     Occlusion and stenosis of bilateral carotid arteries     Other symptoms and signs involving the musculoskeletal system     Pain     Parkinson's disease     Parkinsonism     Peripheral vascular disease     Syncope and collapse     Transient alteration of awareness     Transient retinal artery occlusion, unspecified eye     Urinary retention     Urinary tract infection, site not specified     Uses walker     Uses wheelchair        Social History     Socioeconomic History    Marital status:    Tobacco Use    Smoking status: Former     Types: Cigarettes    Smokeless tobacco: Current     Types: Chew   Substance and Sexual Activity    Alcohol use: Yes     Comment: social    Drug use: Never       Precautions:     Allergies as of 09/03/2024    (No Known Allergies)       [facility] Assessment Details/Treatment      10/02/24 0856   WO Assessment   Visit Date 10/02/24   Visit Time 0856   Consult Type New   [facility] Speciality Wound   Intervention chart review;assessed;applied;orders   Teaching on-going        Wound 10/01/24 1818 Pressure Injury Coccyx   Date First Assessed/Time First Assessed: 10/01/24 1818   Primary Wound Type: Pressure Injury  Location: Coccyx   Wound Image    Dressing Appearance Open to air   Drainage Amount None   Drainage Characteristics/Odor No odor   Appearance " Intact;Dry   Tissue loss description Not applicable   Black (%), Wound Tissue Color 0 %   Red (%), Wound Tissue Color 0 %   Yellow (%), Wound Tissue Color 0 %   Periwound Area Intact;Dry   Wound Edges Undefined   Care Cleansed with:;Soap and water     WOCN consulted for sacrum. Discussed plan of care with nurse Montemayor prior to visiting the patient. Introduced self and explained reason for visit, patient agreeable to be seen. Treatment recommendations to be put in place. Cleanse with soap and water, apply mepilex foam (if patient allows) change every 2 days and PRN. Patient did refuse to allow me to place protective foam over the area. Patient verbalized ability to mobilize himself and nurse agreed. Nursing to continue with treatment recommendations and other preventative measures. Patient educated on the importance of mobilizing and ambulating when possible, he verbalized understanding. Will follow up.   10/02/2024

## 2024-10-02 NOTE — PLAN OF CARE
Contacted Polly with the [facility] to inform her of dc. Facility requesting covid test and would like patient in house by 2pm. Nurse has been notified of time restraint. All clinicals need to be faxed to [phone]. Contacted family regarding dc, they are unable to provide transportation. Left a VM for Polly to determine if facility is able to provide a transport van, waiting on a call back.

## 2024-10-02 NOTE — PLAN OF CARE
SSC sent discharge orders/AVS and clinicals to [facility] via [employer] and [employer].

## 2024-10-04 ENCOUNTER — PATIENT OUTREACH (OUTPATIENT)
Dept: ADMINISTRATIVE | Facility: CLINIC | Age: 84
End: 2024-10-04
Payer: MEDICARE

## 2024-11-12 ENCOUNTER — LAB REQUISITION (OUTPATIENT)
Dept: LAB | Facility: HOSPITAL | Age: 84
End: 2024-11-12
Payer: MEDICARE

## 2024-11-12 DIAGNOSIS — E78.5 HYPERLIPIDEMIA, UNSPECIFIED: ICD-10-CM

## 2024-11-12 DIAGNOSIS — Z00.00 ENCOUNTER FOR GENERAL ADULT MEDICAL EXAMINATION WITHOUT ABNORMAL FINDINGS: ICD-10-CM

## 2024-11-13 LAB
ALT SERPL-CCNC: 11 UNIT/L (ref 1–45)
AST SERPL-CCNC: 20 UNIT/L (ref 17–59)
CHOLEST SERPL-MCNC: 130 MG/DL (ref 0–200)
HDLC SERPL-MCNC: 50 MG/DL (ref 40–60)
LDLC SERPL DIRECT ASSAY-SCNC: 56.7 MG/DL (ref 30–100)
TRIGL SERPL-MCNC: 98 MG/DL (ref 30–200)

## 2024-11-13 PROCEDURE — 84460 ALANINE AMINO (ALT) (SGPT): CPT | Performed by: INTERNAL MEDICINE

## 2024-11-13 PROCEDURE — 80061 LIPID PANEL: CPT | Performed by: INTERNAL MEDICINE

## 2024-11-13 PROCEDURE — 84450 TRANSFERASE (AST) (SGOT): CPT | Performed by: INTERNAL MEDICINE

## 2024-12-26 ENCOUNTER — HOSPITAL ENCOUNTER (EMERGENCY)
Facility: HOSPITAL | Age: 84
End: 2024-12-26
Attending: FAMILY MEDICINE
Payer: MEDICARE

## 2024-12-26 VITALS
RESPIRATION RATE: 19 BRPM | HEIGHT: 72 IN | BODY MASS INDEX: 24.65 KG/M2 | SYSTOLIC BLOOD PRESSURE: 135 MMHG | DIASTOLIC BLOOD PRESSURE: 75 MMHG | WEIGHT: 182 LBS | TEMPERATURE: 99 F | OXYGEN SATURATION: 99 % | HEART RATE: 84 BPM
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
## 2024-12-26 DIAGNOSIS — J06.9 VIRAL URI: Primary | ICD-10-CM

## 2024-12-26 DIAGNOSIS — R05.9 COUGH: ICD-10-CM

## 2024-12-26 LAB
ABS NEUT CALC (OHS): 5.19 X10(3)/MCL (ref 2.1–9.2)
ALBUMIN SERPL-MCNC: 3.4 G/DL (ref 3.4–4.8)
ALBUMIN/GLOB SERPL: 1 RATIO (ref 1.1–2)
ALP SERPL-CCNC: 73 UNIT/L (ref 40–150)
ALT SERPL-CCNC: 6 UNIT/L (ref 0–55)
ANION GAP SERPL CALC-SCNC: 9 MEQ/L
AST SERPL-CCNC: 15 UNIT/L (ref 5–34)
BILIRUB SERPL-MCNC: 0.6 MG/DL
BUN SERPL-MCNC: 32 MG/DL (ref 8.4–25.7)
CALCIUM SERPL-MCNC: 9.6 MG/DL (ref 8.8–10)
CHLORIDE SERPL-SCNC: 110 MMOL/L (ref 98–107)
CO2 SERPL-SCNC: 20 MMOL/L (ref 23–31)
CREAT SERPL-MCNC: 1.7 MG/DL (ref 0.72–1.25)
CREAT/UREA NIT SERPL: 19
ERYTHROCYTE [DISTWIDTH] IN BLOOD BY AUTOMATED COUNT: 12.7 % (ref 11.5–17)
FLUAV AG UPPER RESP QL IA.RAPID: NOT DETECTED
FLUBV AG UPPER RESP QL IA.RAPID: NOT DETECTED
GFR SERPLBLD CREATININE-BSD FMLA CKD-EPI: 39 ML/MIN/1.73/M2
GLOBULIN SER-MCNC: 3.5 GM/DL (ref 2.4–3.5)
GLUCOSE SERPL-MCNC: 159 MG/DL (ref 82–115)
HCT VFR BLD AUTO: 36 % (ref 42–52)
HGB BLD-MCNC: 11.9 G/DL (ref 14–18)
LYMPHOCYTES NFR BLD MANUAL: 0.73 X10(3)/MCL
LYMPHOCYTES NFR BLD MANUAL: 12 % (ref 13–40)
MCH RBC QN AUTO: 30.7 PG (ref 27–31)
MCHC RBC AUTO-ENTMCNC: 33.1 G/DL (ref 33–36)
MCV RBC AUTO: 92.8 FL (ref 80–94)
MONOCYTES NFR BLD MANUAL: 0.18 X10(3)/MCL (ref 0.1–1.3)
MONOCYTES NFR BLD MANUAL: 3 % (ref 2–11)
NEUTROPHILS NFR BLD MANUAL: 85 % (ref 47–80)
PLATELET # BLD AUTO: 178 X10(3)/MCL (ref 130–400)
PLATELET # BLD EST: NORMAL 10*3/UL
PMV BLD AUTO: 9.5 FL (ref 7.4–10.4)
POTASSIUM SERPL-SCNC: 4.1 MMOL/L (ref 3.5–5.1)
PROT SERPL-MCNC: 6.9 GM/DL (ref 5.8–7.6)
RBC # BLD AUTO: 3.88 X10(6)/MCL (ref 4.7–6.1)
SARS-COV-2 RNA RESP QL NAA+PROBE: NOT DETECTED
SODIUM SERPL-SCNC: 139 MMOL/L (ref 136–145)
WBC # BLD AUTO: 6.11 X10(3)/MCL (ref 4.5–11.5)

## 2024-12-26 PROCEDURE — 99284 EMERGENCY DEPT VISIT MOD MDM: CPT | Mod: 25

## 2024-12-26 PROCEDURE — 85025 COMPLETE CBC W/AUTO DIFF WBC: CPT | Performed by: FAMILY MEDICINE

## 2024-12-26 PROCEDURE — 63600175 PHARM REV CODE 636 W HCPCS: Performed by: FAMILY MEDICINE

## 2024-12-26 PROCEDURE — 80053 COMPREHEN METABOLIC PANEL: CPT | Performed by: FAMILY MEDICINE

## 2024-12-26 PROCEDURE — 25000003 PHARM REV CODE 250: Performed by: FAMILY MEDICINE

## 2024-12-26 PROCEDURE — 96361 HYDRATE IV INFUSION ADD-ON: CPT

## 2024-12-26 PROCEDURE — 0240U COVID/FLU A&B PCR: CPT | Performed by: FAMILY MEDICINE

## 2024-12-26 PROCEDURE — 96374 THER/PROPH/DIAG INJ IV PUSH: CPT

## 2024-12-26 RX ORDER — IPRATROPIUM BROMIDE 21 UG/1
2 SPRAY, METERED NASAL 3 TIMES DAILY
Qty: 5 ML | Refills: 0 | Status: SHIPPED | OUTPATIENT
Start: 2024-12-26

## 2024-12-26 RX ORDER — CETIRIZINE HYDROCHLORIDE 10 MG/1
10 TABLET ORAL DAILY
Qty: 30 TABLET | Refills: 0 | Status: SHIPPED | OUTPATIENT
Start: 2024-12-26 | End: 2025-12-26

## 2024-12-26 RX ORDER — ONDANSETRON HYDROCHLORIDE 2 MG/ML
4 INJECTION, SOLUTION INTRAVENOUS
Status: COMPLETED | OUTPATIENT
Start: 2024-12-26 | End: 2024-12-26

## 2024-12-26 RX ORDER — BENZONATATE 100 MG/1
100 CAPSULE ORAL 3 TIMES DAILY PRN
Qty: 30 CAPSULE | Refills: 0 | Status: SHIPPED | OUTPATIENT
Start: 2024-12-26 | End: 2025-01-05

## 2024-12-26 RX ORDER — AMOXICILLIN AND CLAVULANATE POTASSIUM 875; 125 MG/1; MG/1
1 TABLET, FILM COATED ORAL 2 TIMES DAILY
Qty: 14 TABLET | Refills: 0 | Status: SHIPPED | OUTPATIENT
Start: 2024-12-26

## 2024-12-26 RX ADMIN — SODIUM CHLORIDE 1000 ML: 9 INJECTION, SOLUTION INTRAVENOUS at 07:12

## 2024-12-26 RX ADMIN — ONDANSETRON 4 MG: 2 INJECTION INTRAMUSCULAR; INTRAVENOUS at 06:12

## 2024-12-26 RX ADMIN — SODIUM CHLORIDE 1000 ML: 9 INJECTION, SOLUTION INTRAVENOUS at 06:12

## 2024-12-27 NOTE — ED PROVIDER NOTES
"Encounter Date: 12/26/2024       History     Chief Complaint   Patient presents with    Fall     Fall today due to left leg giving out on him. C/o pain below left knee. Thinks he may have to flu     Patient has 1 day history of nausea, vomiting, cough, rhinorrhea.  Feeling a little weak.  Mildly fell onto his left knee earlier.  No chest pain.  No shortness of breath.        Review of patient's allergies indicates:  No Known Allergies  Past Medical History:   Diagnosis Date    Allergic rhinitis     Amnesia     BPH with urinary obstruction     Chest pain syndrome     Difficulty walking     Dizziness and giddiness     Drusen (degenerative) of macula, bilateral     Elevated PSA     Generalized muscle weakness     HLD (hyperlipidemia)     Hypertension     Indwelling urinary catheter present     "caude"    Lack of coordination     Low back pain     Nonrheumatic mitral (valve) insufficiency     Occlusion and stenosis of bilateral carotid arteries     Other symptoms and signs involving the musculoskeletal system     Pain     Parkinson's disease     Parkinsonism     Peripheral vascular disease     Syncope and collapse     Transient alteration of awareness     Transient retinal artery occlusion, unspecified eye     Urinary retention     Urinary tract infection, site not specified     Uses walker     Uses wheelchair      Past Surgical History:   Procedure Laterality Date    COLON SURGERY  2016    HERNIA REPAIR  1986    TRANSURETHRAL RESECTION OF PROSTATE N/A 10/1/2024    Procedure: TURP (TRANSURETHRAL RESECTION OF PROSTATE);  Surgeon: Naun Mcneil MD;  Location: [facility];  Service: Urology;  Laterality: N/A;     No family history on file.  Social History     Tobacco Use    Smoking status: Former     Types: Cigarettes    Smokeless tobacco: Current     Types: Chew   Substance Use Topics    Alcohol use: Yes     Comment: social    Drug use: Never     Review of Systems   All other systems reviewed and are " negative.      Physical Exam     Initial Vitals [12/26/24 1801]   BP Pulse Resp Temp SpO2   (!) 154/86 95 20 98.7 °F (37.1 °C) (!) 94 %      MAP       --         Physical Exam    Nursing note and vitals reviewed.  Constitutional: He appears well-developed and well-nourished.   HENT:   Head: Normocephalic and atraumatic.   Eyes: Conjunctivae are normal.   Neck: Neck supple.   Pulmonary/Chest: No respiratory distress.   Abdominal: Abdomen is soft.   Musculoskeletal:      Cervical back: Neck supple.     Neurological: He is alert and oriented to person, place, and time.   Skin: Skin is warm.   Psychiatric: He has a normal mood and affect. Thought content normal.         ED Course   Procedures  Labs Reviewed   COMPREHENSIVE METABOLIC PANEL - Abnormal       Result Value    Sodium 139      Potassium 4.1      Chloride 110 (*)     CO2 20 (*)     Glucose 159 (*)     Blood Urea Nitrogen 32.0 (*)     Creatinine 1.70 (*)     Calcium 9.6      Protein Total 6.9      Albumin 3.4      Globulin 3.5      Albumin/Globulin Ratio 1.0 (*)     Bilirubin Total 0.6      ALP 73      ALT 6      AST 15      eGFR 39      Anion Gap 9.0      BUN/Creatinine Ratio 19     CBC WITH DIFFERENTIAL - Abnormal    WBC 6.11      RBC 3.88 (*)     Hgb 11.9 (*)     Hct 36.0 (*)     MCV 92.8      MCH 30.7      MCHC 33.1      RDW 12.7      Platelet 178      MPV 9.5     MANUAL DIFFERENTIAL - Abnormal    Neutrophils % 85 (*)     Lymphs % 12 (*)     Monocytes % 3      Neutrophils Abs Calc 5.1935      Lymphs Abs 0.7332      Monocytes Abs 0.1833      Platelets Normal     COVID/FLU A&B PCR - Normal    Influenza A PCR Not Detected      Influenza B PCR Not Detected      SARS-CoV-2 PCR Not Detected      Narrative:     The Xpert Xpress SARS-CoV-2/FLU/RSV plus is a rapid, multiplexed real-time PCR test intended for the simultaneous qualitative detection and differentiation of SARS-CoV-2, Influenza A, Influenza B, and respiratory syncytial virus (RSV) viral RNA in either  nasopharyngeal swab or nasal swab specimens.         CBC W/ AUTO DIFFERENTIAL    Narrative:     The following orders were created for panel order CBC auto differential.  Procedure                               Abnormality         Status                     ---------                               -----------         ------                     CBC with Differential[[id number]]       Abnormal            Final result               Manual Differential[[id number]]         Abnormal            Final result                 Please view results for these tests on the individual orders.          Imaging Results              X-Ray Chest 1 View (In process)                      X-Ray Knee 1 or 2 View Left (In process)                      Medications   sodium chloride 0.9% bolus 1,000 mL 1,000 mL (0 mLs Intravenous Stopped 12/26/24 1957)   ondansetron injection 4 mg (4 mg Intravenous Given 12/26/24 1845)   sodium chloride 0.9% bolus 1,000 mL 1,000 mL (0 mLs Intravenous Stopped 12/26/24 2035)     Medical Decision Making  Amount and/or Complexity of Data Reviewed  Labs: ordered.  Radiology: ordered.    Risk  OTC drugs.  Prescription drug management.                                      Clinical Impression:  Final diagnoses:  [R05.9] Cough  [J06.9] Viral URI (Primary)  [W19.XXXA] Fall, initial encounter          ED Disposition Condition    Discharge Stable          ED Prescriptions       Medication Sig Dispense Start Date End Date Auth. Provider    cetirizine (ZYRTEC) 10 MG tablet Take 1 tablet (10 mg total) by mouth once daily. 30 tablet 12/26/2024 12/26/2025 David Mojica Jr., MD    benzonatate (TESSALON) 100 MG capsule Take 1 capsule (100 mg total) by mouth 3 (three) times daily as needed for Cough. 30 capsule 12/26/2024 1/5/2025 David Mojica Jr., MD    ipratropium (ATROVENT) 21 mcg (0.03 %) nasal spray 2 sprays by Each Nostril route 3 (three) times daily. 5 mL 12/26/2024 -- David Mojica Jr., MD     amoxicillin-clavulanate 875-125mg (AUGMENTIN) 875-125 mg per tablet Take 1 tablet by mouth 2 (two) times daily. 14 tablet 12/26/2024 -- David Mojica Jr., MD          Follow-up Information    None          David Mojica Jr., MD  12/26/24 2048

## 2025-05-21 ENCOUNTER — LAB REQUISITION (OUTPATIENT)
Dept: LAB | Facility: HOSPITAL | Age: 85
End: 2025-05-21
Attending: INTERNAL MEDICINE
Payer: MEDICARE

## 2025-05-21 DIAGNOSIS — E03.9 HYPOTHYROIDISM, UNSPECIFIED: ICD-10-CM

## 2025-05-21 DIAGNOSIS — D51.0 VITAMIN B12 DEFICIENCY ANEMIA DUE TO INTRINSIC FACTOR DEFICIENCY: ICD-10-CM

## 2025-05-21 DIAGNOSIS — E78.5 HYPERLIPIDEMIA, UNSPECIFIED: ICD-10-CM

## 2025-05-21 DIAGNOSIS — I10 ESSENTIAL (PRIMARY) HYPERTENSION: ICD-10-CM

## 2025-05-21 DIAGNOSIS — Z00.00 ENCOUNTER FOR GENERAL ADULT MEDICAL EXAMINATION WITHOUT ABNORMAL FINDINGS: ICD-10-CM

## 2025-05-21 DIAGNOSIS — D64.9 ANEMIA, UNSPECIFIED: ICD-10-CM

## 2025-05-21 LAB
ALBUMIN SERPL-MCNC: 3.7 G/DL (ref 3.4–5)
ALBUMIN/GLOB SERPL: 1.5 RATIO
ALP SERPL-CCNC: 70 UNIT/L (ref 50–144)
ALT SERPL-CCNC: 10 UNIT/L (ref 1–45)
ANION GAP SERPL CALC-SCNC: 3 MEQ/L (ref 2–13)
AST SERPL-CCNC: 21 UNIT/L (ref 17–59)
BASOPHILS # BLD AUTO: 0.06 X10(3)/MCL (ref 0.01–0.08)
BASOPHILS NFR BLD AUTO: 1.2 % (ref 0.1–1.2)
BILIRUB SERPL-MCNC: 0.5 MG/DL (ref 0–1)
BUN SERPL-MCNC: 33 MG/DL (ref 7–20)
CALCIUM SERPL-MCNC: 9.4 MG/DL (ref 8.4–10.2)
CHLORIDE SERPL-SCNC: 111 MMOL/L (ref 98–110)
CHOLEST SERPL-MCNC: 119 MG/DL (ref 0–200)
CO2 SERPL-SCNC: 25 MMOL/L (ref 21–32)
CREAT SERPL-MCNC: 1.82 MG/DL (ref 0.66–1.25)
CREAT/UREA NIT SERPL: 18 (ref 12–20)
EOSINOPHIL # BLD AUTO: 0.38 X10(3)/MCL (ref 0.04–0.54)
EOSINOPHIL NFR BLD AUTO: 7.7 % (ref 0.7–7)
ERYTHROCYTE [DISTWIDTH] IN BLOOD BY AUTOMATED COUNT: 13.1 %
GFR SERPLBLD CREATININE-BSD FMLA CKD-EPI: 36 ML/MIN/1.73/M2
GLOBULIN SER-MCNC: 2.4 GM/DL (ref 2–3.9)
GLUCOSE SERPL-MCNC: 87 MG/DL (ref 70–115)
HCT VFR BLD AUTO: 36.6 % (ref 36–52)
HDLC SERPL-MCNC: 40 MG/DL (ref 40–60)
HGB BLD-MCNC: 12.2 G/DL (ref 13–18)
IMM GRANULOCYTES # BLD AUTO: 0.01 X10(3)/MCL (ref 0–0.03)
IMM GRANULOCYTES NFR BLD AUTO: 0.2 % (ref 0–0.5)
LDLC SERPL DIRECT ASSAY-SCNC: 56.5 MG/DL (ref 30–100)
LYMPHOCYTES # BLD AUTO: 2.23 X10(3)/MCL (ref 1.32–3.57)
LYMPHOCYTES NFR BLD AUTO: 45.1 % (ref 20–55)
MCH RBC QN AUTO: 31.1 PG (ref 27–34)
MCHC RBC AUTO-ENTMCNC: 33.3 G/DL (ref 31–37)
MCV RBC AUTO: 93.4 FL (ref 79–99)
MONOCYTES # BLD AUTO: 0.56 X10(3)/MCL (ref 0.3–0.82)
MONOCYTES NFR BLD AUTO: 11.3 % (ref 4.7–12.5)
NEUTROPHILS # BLD AUTO: 1.7 X10(3)/MCL (ref 1.78–5.38)
NEUTROPHILS NFR BLD AUTO: 34.5 % (ref 37–73)
NRBC BLD AUTO-RTO: 0 %
PLATELET # BLD AUTO: 176 X10(3)/MCL (ref 140–371)
PMV BLD AUTO: 10.1 FL (ref 9.4–12.4)
POTASSIUM SERPL-SCNC: 5.2 MMOL/L (ref 3.5–5.1)
PROT SERPL-MCNC: 6.1 GM/DL (ref 6.3–8.2)
RBC # BLD AUTO: 3.92 X10(6)/MCL (ref 4–6)
SODIUM SERPL-SCNC: 139 MMOL/L (ref 136–145)
TRIGL SERPL-MCNC: 90 MG/DL (ref 30–200)
TSH SERPL-ACNC: 2.29 UIU/ML (ref 0.36–3.74)
VIT B12 SERPL-MCNC: 291 PG/ML (ref 211–946)
WBC # BLD AUTO: 4.94 X10(3)/MCL (ref 4–11.5)

## 2025-05-21 PROCEDURE — 82607 VITAMIN B-12: CPT | Performed by: INTERNAL MEDICINE

## 2025-05-21 PROCEDURE — 80053 COMPREHEN METABOLIC PANEL: CPT | Performed by: INTERNAL MEDICINE

## 2025-05-21 PROCEDURE — 84443 ASSAY THYROID STIM HORMONE: CPT | Performed by: INTERNAL MEDICINE

## 2025-05-21 PROCEDURE — 80061 LIPID PANEL: CPT | Performed by: INTERNAL MEDICINE

## 2025-05-21 PROCEDURE — 85025 COMPLETE CBC W/AUTO DIFF WBC: CPT | Performed by: INTERNAL MEDICINE

## 2025-06-25 ENCOUNTER — LAB REQUISITION (OUTPATIENT)
Dept: LAB | Facility: HOSPITAL | Age: 85
End: 2025-06-25
Payer: MEDICARE

## 2025-06-25 DIAGNOSIS — E87.5 HYPERKALEMIA: ICD-10-CM

## 2025-06-25 LAB
ANION GAP SERPL CALC-SCNC: 2 MEQ/L (ref 2–13)
BUN SERPL-MCNC: 24 MG/DL (ref 7–20)
CALCIUM SERPL-MCNC: 9.1 MG/DL (ref 8.4–10.2)
CHLORIDE SERPL-SCNC: 110 MMOL/L (ref 98–110)
CO2 SERPL-SCNC: 25 MMOL/L (ref 21–32)
CREAT SERPL-MCNC: 1.48 MG/DL (ref 0.66–1.25)
CREAT/UREA NIT SERPL: 16 (ref 12–20)
GFR SERPLBLD CREATININE-BSD FMLA CKD-EPI: 46 ML/MIN/1.73/M2
GLUCOSE SERPL-MCNC: 88 MG/DL (ref 70–115)
POTASSIUM SERPL-SCNC: 4.4 MMOL/L (ref 3.5–5.1)
SODIUM SERPL-SCNC: 137 MMOL/L (ref 136–145)

## 2025-06-25 PROCEDURE — 80048 BASIC METABOLIC PNL TOTAL CA: CPT | Performed by: INTERNAL MEDICINE

## (undated) DEVICE — BAG DRAIN UROLOGY AND HOSE

## (undated) DEVICE — ELECTRODE PATIENT RETURN DISP

## (undated) DEVICE — SYR 30CC LUER LOCK

## (undated) DEVICE — LUBRICANT SURGILUBE 2 OZ

## (undated) DEVICE — GLOVE PROTEXIS HYDROGEL SZ7.5

## (undated) DEVICE — TRAY SKIN SCRUB WET PREMIUM

## (undated) DEVICE — TUBE SUCTION MEDI-VAC STERILE

## (undated) DEVICE — Device

## (undated) DEVICE — SUPPORT ULNA NERVE PROTECTOR

## (undated) DEVICE — SET UROLOGY TBNG UP TO 300MMHG

## (undated) DEVICE — SOL NACL IRR 3000ML

## (undated) DEVICE — LOOP PLASMALOOP 12-30DEG 24FR

## (undated) DEVICE — SYR 50ML CATH TIP

## (undated) DEVICE — BAG DRAIN ANTI REFLUX 2000ML

## (undated) DEVICE — IRRIGATION SET Y-TYPE TUR/BLAD